# Patient Record
Sex: MALE | Race: WHITE | NOT HISPANIC OR LATINO | Employment: UNEMPLOYED | URBAN - METROPOLITAN AREA
[De-identification: names, ages, dates, MRNs, and addresses within clinical notes are randomized per-mention and may not be internally consistent; named-entity substitution may affect disease eponyms.]

---

## 2017-12-29 ENCOUNTER — RADIANT APPOINTMENT (OUTPATIENT)
Dept: GENERAL RADIOLOGY | Facility: CLINIC | Age: 22
End: 2017-12-29
Attending: ORTHOPAEDIC SURGERY
Payer: COMMERCIAL

## 2017-12-29 ENCOUNTER — OFFICE VISIT (OUTPATIENT)
Dept: ORTHOPEDICS | Facility: CLINIC | Age: 22
End: 2017-12-29
Payer: COMMERCIAL

## 2017-12-29 VITALS — HEIGHT: 70 IN | TEMPERATURE: 98.2 F | BODY MASS INDEX: 20.62 KG/M2 | WEIGHT: 144 LBS

## 2017-12-29 DIAGNOSIS — S69.90XA HAND INJURY: ICD-10-CM

## 2017-12-29 DIAGNOSIS — S62.337A CLOSED DISPLACED FRACTURE OF NECK OF FIFTH METACARPAL BONE OF LEFT HAND, INITIAL ENCOUNTER: Primary | ICD-10-CM

## 2017-12-29 PROCEDURE — 73130 X-RAY EXAM OF HAND: CPT | Mod: TC

## 2017-12-29 PROCEDURE — 26600 TREAT METACARPAL FRACTURE: CPT | Mod: LT | Performed by: ORTHOPAEDIC SURGERY

## 2017-12-29 PROCEDURE — 99203 OFFICE O/P NEW LOW 30 MIN: CPT | Mod: 57 | Performed by: ORTHOPAEDIC SURGERY

## 2017-12-29 ASSESSMENT — PAIN SCALES - GENERAL: PAINLEVEL: WORST PAIN (10)

## 2017-12-29 NOTE — LETTER
12/29/2017         RE: Nomi Miller  [address] [place] [id number]        Dear Colleague,    Thank you for referring your patient, Nomi Miller, to the [facility]. Please see a copy of my visit note below.    ORTHOPEDIC CONSULT      Chief Complaint: Nomi Miller is a 22 year old male who is being seen for Chief Complaint   Patient presents with     Consult     left hand fracture hitting glass at the [facility], DOI: 12/22/17       History of Present Illness:   Inmate brought in by the .  Closed fist injury versus a door.  This happened December 22, 2017.  Denies any other injuries.  He reports some numbness over the dorsum of his hand.        Patient's past medical, surgical, social and family histories reviewed.     Past Medical History:   Diagnosis Date     Anxiety      Depression      Reactive attachment disorder        Past Surgical History:   Procedure Laterality Date     NO HISTORY OF SURGERY         Medications:    Current Outpatient Prescriptions on File Prior to Visit:  amoxicillin-clavulanate (AUGMENTIN) 875-125 MG per tablet Take 1 tablet by mouth every 12 hours. (Patient not taking: Reported on 12/29/2017)   Dexmethylphenidate HCl (FOCALIN PO) Take 20 mg by mouth.   FLUoxetine (PROZAC) 10 MG capsule Take 40 mg by mouth At Bedtime.   QUEtiapine (SEROQUEL) 50 MG tablet Take 1 tablet by mouth every evening. (Patient not taking: Reported on 12/29/2017)   mirtazapine (REMERON) 15 MG tablet Take 3 tablets by mouth At Bedtime. (Patient not taking: Reported on 12/29/2017)     No current facility-administered medications on file prior to visit.     No Known Allergies    Social History     Occupational History     Not on file.     Social History Main Topics     Smoking status: Current Every Day Smoker     Packs/day: 0.50     Smokeless tobacco: Never Used     Alcohol use Yes      Comment: Once very couple months, 8 shots, no DWI, ED visits     Drug use: 2.00 per week      "Special: Marijuana      Comment: vicodin - last took on 1/4/13 (2 pills)     Sexual activity: Yes     Partners: Female      Comment: sexually active with one partner x several years.  Reports being tested in March 2012 (negative)       Family History   Problem Relation Age of Onset     Depression       Substance Abuse       alcohol, meth, heroin, THC     Psychotic Disorder       ADHD       REVIEW OF SYSTEMS  10 point review systems performed otherwise negative as noted as per history of present illness.    Physical Exam:  Vitals: Temp 98.2  F (36.8  C) (Temporal)  Ht 5' 10\" (1.778 m)  Wt 144 lb (65.3 kg)  BMI 20.66 kg/m2  BMI= Body mass index is 20.66 kg/(m^2).  Constitutional: healthy, alert and no acute distress   Psychiatric: mentation appears normal and affect normal/bright  NEURO: no focal deficits  RESP: Normal with easy respirations and no use of accessory muscles to breathe, no audible wheezing or retractions  CV: LUE:   no edema         Regular rate and rhythm by palpation  SKIN: No erythema, rashes, excoriation, or breakdown. No evidence of infection.   JOINT/EXTREMITIES:left hand: Tenderness over the fifth metacarpal.  Ecchymosis and swelling (minimal swelling) over the dorsum of the hand.  No rotational deformity.  No gross deformity.  Flexors and extensors are grossly intact.  Elbow range of motion not fully tested to the small finger.  No wrist pain.     GAIT: not tested     Diagnostic Modalities:  left hand X-ray: Very minimally displaced fifth metacarpal neck fracture  Independent visualization of the images was performed.      Impression: left very minimally displaced fifth metacarpal neck fracture-1 week from injury    Plan:  All of the above pertinent physical exam and imaging modalities findings was reviewed with Nomi and the .  Options reviewed.  Radiographs show very minimal displacement.  1 week from injury.  Recommend casting 3-4 weeks.                                      " CAST/SPLINT APPLICATION:  On today's visit a well padded Fiberglass with waterproof padding  short arm intern cast was applied to the left upper extremity. The neurovascular status is unchanged after application. Cast/splint care was discussed.    Restrictions: No use of affected extremity        Return to clinic 3-4 weeks, or sooner as needed for changes.  Re-x-ray on return: Yes, out of cast first.     Conrad Hoang D.O.    Again, thank you for allowing me to participate in the care of your patient.        Sincerely,        Pj Hoang, DO

## 2017-12-29 NOTE — NURSING NOTE
"Chief Complaint   Patient presents with     Consult     left hand fracture [facility] at the group home, DOI: 12/22/17       Initial Temp 98.2  F (36.8  C) (Temporal)  Ht 1.778 m (5' 10\")  Wt 65.3 kg (144 lb)  BMI 20.66 kg/m2 Estimated body mass index is 20.66 kg/(m^2) as calculated from the following:    Height as of this encounter: 1.778 m (5' 10\").    Weight as of this encounter: 65.3 kg (144 lb).  Medication Reconciliation: complete    "

## 2017-12-29 NOTE — PROGRESS NOTES
ORTHOPEDIC CONSULT      Chief Complaint: Nomi Miller is a 22 year old male who is being seen for Chief Complaint   Patient presents with     Consult     [facility], DOI: 12/22/17       History of Present Illness:   Inmate brought in by the .  Closed fist injury versus a door.  This happened December 22, 2017.  Denies any other injuries.  He reports some numbness over the dorsum of his hand.        Patient's past medical, surgical, social and family histories reviewed.     Past Medical History:   Diagnosis Date     Anxiety      Depression      Reactive attachment disorder        Past Surgical History:   Procedure Laterality Date     NO HISTORY OF SURGERY         Medications:    Current Outpatient Prescriptions on File Prior to Visit:  amoxicillin-clavulanate (AUGMENTIN) 875-125 MG per tablet Take 1 tablet by mouth every 12 hours. (Patient not taking: Reported on 12/29/2017)   Dexmethylphenidate HCl (FOCALIN PO) Take 20 mg by mouth.   FLUoxetine (PROZAC) 10 MG capsule Take 40 mg by mouth At Bedtime.   QUEtiapine (SEROQUEL) 50 MG tablet Take 1 tablet by mouth every evening. (Patient not taking: Reported on 12/29/2017)   mirtazapine (REMERON) 15 MG tablet Take 3 tablets by mouth At Bedtime. (Patient not taking: Reported on 12/29/2017)     No current facility-administered medications on file prior to visit.     No Known Allergies    Social History     Occupational History     Not on file.     Social History Main Topics     Smoking status: Current Every Day Smoker     Packs/day: 0.50     Smokeless tobacco: Never Used     Alcohol use Yes      Comment: Once very couple months, 8 shots, no DWI, ED visits     Drug use: 2.00 per week     Special: Marijuana      Comment: vicodin - last took on 1/4/13 (2 pills)     Sexual activity: Yes     Partners: Female      Comment: sexually active with one partner x several years.  Reports being tested in March 2012 (negative)       Family  "History   Problem Relation Age of Onset     Depression       Substance Abuse       alcohol, meth, heroin, THC     Psychotic Disorder       ADHD       REVIEW OF SYSTEMS  10 point review systems performed otherwise negative as noted as per history of present illness.    Physical Exam:  Vitals: Temp 98.2  F (36.8  C) (Temporal)  Ht 5' 10\" (1.778 m)  Wt 144 lb (65.3 kg)  BMI 20.66 kg/m2  BMI= Body mass index is 20.66 kg/(m^2).  Constitutional: healthy, alert and no acute distress   Psychiatric: mentation appears normal and affect normal/bright  NEURO: no focal deficits  RESP: Normal with easy respirations and no use of accessory muscles to breathe, no audible wheezing or retractions  CV: LUE:   no edema         Regular rate and rhythm by palpation  SKIN: No erythema, rashes, excoriation, or breakdown. No evidence of infection.   JOINT/EXTREMITIES:left hand: Tenderness over the fifth metacarpal.  Ecchymosis and swelling (minimal swelling) over the dorsum of the hand.  No rotational deformity.  No gross deformity.  Flexors and extensors are grossly intact.  Elbow range of motion not fully tested to the small finger.  No wrist pain.     GAIT: not tested     Diagnostic Modalities:  left hand X-ray: Very minimally displaced fifth metacarpal neck fracture  Independent visualization of the images was performed.      Impression: left very minimally displaced fifth metacarpal neck fracture-1 week from injury    Plan:  All of the above pertinent physical exam and imaging modalities findings was reviewed with Nomi and the .  Options reviewed.  Radiographs show very minimal displacement.  1 week from injury.  Recommend casting 3-4 weeks.                                      CAST/SPLINT APPLICATION:  On today's visit a well padded Fiberglass with waterproof padding  short arm intern cast was applied to the left upper extremity. The neurovascular status is unchanged after application. Cast/splint care was " discussed.    Restrictions: No use of affected extremity        Return to clinic 3-4 weeks, or sooner as needed for changes.  Re-x-ray on return: Yes, out of cast first.     Conrad Hoang D.O.

## 2017-12-29 NOTE — MR AVS SNAPSHOT
"              After Visit Summary   2017    Nomi Miller    MRN: [id number]           Patient Information     Date Of Birth          1995        Visit Information        Provider Department      2017 3:30 PM Pj Hoang,  [facility]        Today's Diagnoses     Closed displaced fracture of neck of fifth metacarpal bone of left hand, initial encounter    -  1       Follow-ups after your visit        Who to contact     If you have questions or need follow up information about today's clinic visit or your schedule please contact [facility] directly at [phone].  Normal or non-critical lab and imaging results will be communicated to you by Saviokehart, letter or phone within 4 business days after the clinic has received the results. If you do not hear from us within 7 days, please contact the clinic through Saviokehart or phone. If you have a critical or abnormal lab result, we will notify you by phone as soon as possible.  Submit refill requests through Bevvy or call your pharmacy and they will forward the refill request to us. Please allow 3 business days for your refill to be completed.          Additional Information About Your Visit        MyChart Information     Bevvy lets you send messages to your doctor, view your test results, renew your prescriptions, schedule appointments and more. To sign up, go to [email or web address] . Click on \"Log in\" on the left side of the screen, which will take you to the Welcome page. Then click on \"Sign up Now\" on the right side of the page.     You will be asked to enter the access code listed below, as well as some personal information. Please follow the directions to create your username and password.     Your access code is: [email or web address]  Expires: 3/29/2018  4:15 PM     Your access code will  in 90 days. If you need help or a new code, please call your [facility] or [phone].        Care " "EveryWhere ID     This is your Care EveryWhere ID. This could be used by other organizations to access your [facility] medical records  [id number]        Your Vitals Were     Temperature Height BMI (Body Mass Index)             98.2  F (36.8  C) (Temporal) 5' 10\" (1.778 m) 20.66 kg/m2          Blood Pressure from Last 3 Encounters:   01/12/13 121/71   07/23/12 137/71    Weight from Last 3 Encounters:   12/29/17 144 lb (65.3 kg)   01/09/13 152 lb 12.8 oz (69.3 kg) (60 %)*   07/18/12 143 lb (64.9 kg) (49 %)*     * Growth percentiles are based on CDC 2-20 Years data.              We Performed the Following     APPLY SHORT ARM CAST        Primary Care Provider Office Phone # Fax #    Marcellus Henry [phone]       [facility] [address] [place] [id number]        Equal Access to Services     [facility] : Hadii aad ku hadasho Soomaali, waaxda luqadaha, qaybta kaalmada adeegyada, waxay idiin haylesleyn hans blanco . So [facility] [phone].    ATENCIÓN: Si habla español, tiene a mcmanus disposición servicios gratuitos de asistencia lingüística. Llame al [phone].    We comply with applicable federal civil rights laws and [place] laws. We do not discriminate on the basis of race, color, national origin, age, disability, sex, sexual orientation, or gender identity.            Thank you!     Thank you for choosing [facility]  for your care. Our goal is always to provide you with excellent care. Hearing back from our patients is one way we can continue to improve our services. Please take a few minutes to complete the written survey that you may receive in the mail after your visit with us. Thank you!             Your Updated Medication List - Protect others around you: Learn how to safely use, store and throw away your medicines at www.disposemymeds.org.          This list is accurate as of: 12/29/17  4:15 PM.  Always use your most recent med list.                   " Brand Name Dispense Instructions for use Diagnosis    amoxicillin-clavulanate 875-125 MG per tablet    AUGMENTIN    20 tablet    Take 1 tablet by mouth every 12 hours.    Self-injurious behavior       FLUoxetine 10 MG capsule    PROzac     Take 40 mg by mouth At Bedtime.        FOCALIN PO      Take 20 mg by mouth.        IBUPROFEN PO      Take 600 mg by mouth        mirtazapine 15 MG tablet    REMERON    30 tablet    Take 3 tablets by mouth At Bedtime.    Depression, major, recurrent (H)       QUEtiapine 50 MG tablet    SEROquel    30 tablet    Take 1 tablet by mouth every evening.    Depression, major, recurrent (H)

## 2018-01-11 ENCOUNTER — RADIANT APPOINTMENT (OUTPATIENT)
Dept: GENERAL RADIOLOGY | Facility: CLINIC | Age: 23
End: 2018-01-11
Attending: ORTHOPAEDIC SURGERY
Payer: COMMERCIAL

## 2018-01-11 ENCOUNTER — OFFICE VISIT (OUTPATIENT)
Dept: ORTHOPEDICS | Facility: CLINIC | Age: 23
End: 2018-01-11
Payer: COMMERCIAL

## 2018-01-11 VITALS — BODY MASS INDEX: 20.72 KG/M2 | WEIGHT: 148 LBS | HEIGHT: 71 IN | TEMPERATURE: 96 F

## 2018-01-11 DIAGNOSIS — S62.337A CLOSED DISPLACED FRACTURE OF NECK OF FIFTH METACARPAL BONE OF LEFT HAND, INITIAL ENCOUNTER: ICD-10-CM

## 2018-01-11 DIAGNOSIS — S62.337A CLOSED DISPLACED FRACTURE OF NECK OF FIFTH METACARPAL BONE OF LEFT HAND, INITIAL ENCOUNTER: Primary | ICD-10-CM

## 2018-01-11 PROCEDURE — 99213 OFFICE O/P EST LOW 20 MIN: CPT | Performed by: ORTHOPAEDIC SURGERY

## 2018-01-11 PROCEDURE — 73130 X-RAY EXAM OF HAND: CPT | Mod: TC

## 2018-01-11 ASSESSMENT — PAIN SCALES - GENERAL: PAINLEVEL: MODERATE PAIN (4)

## 2018-01-11 NOTE — NURSING NOTE
"Chief Complaint   Patient presents with     RECHECK     left very minimally displaced fifth metacarpal neck fracture- DOI: 12/22/2017       Initial Temp 96  F (35.6  C)  Ht 1.803 m (5' 11\")  Wt 67.1 kg (148 lb)  BMI 20.64 kg/m2 Estimated body mass index is 20.64 kg/(m^2) as calculated from the following:    Height as of this encounter: 1.803 m (5' 11\").    Weight as of this encounter: 67.1 kg (148 lb).  Medication Reconciliation: complete    BP completed using cuff size: NA (Not Taken)    Laxmi Pinto MA      "

## 2018-01-11 NOTE — MR AVS SNAPSHOT
"              After Visit Summary   1/11/2018    Nomi Miller    MRN: [id number]           Patient Information     Date Of Birth          1995        Visit Information        Provider Department      1/11/2018 8:10 AM Pj Hoang DO [facility]        Today's Diagnoses     Closed displaced fracture of neck of fifth metacarpal bone of left hand, initial encounter    -  1       Follow-ups after your visit        Your next 10 appointments already scheduled     Jan 25, 2018  8:10 AM CST   Return Visit with Pj Hoang DO   [facility]    [address] [place] [id number] [phone]              Who to contact     If you have questions or need follow up information about today's clinic visit or your schedule please contact [facility] directly at [phone].  Normal or non-critical lab and imaging results will be communicated to you by MyChart, letter or phone within 4 business days after the clinic has received the results. If you do not hear from us within 7 days, please contact the clinic through MyChart or phone. If you have a critical or abnormal lab result, we will notify you by phone as soon as possible.  Submit refill requests through Sincerely or call your pharmacy and they will forward the refill request to us. Please allow 3 business days for your refill to be completed.          Additional Information About Your Visit        MyChart Information     Sincerely lets you send messages to your doctor, view your test results, renew your prescriptions, schedule appointments and more. To sign up, go to [email or web address] . Click on \"Log in\" on the left side of the screen, which will take you to the Welcome page. Then click on \"Sign up Now\" on the right side of the page.     You will be asked to enter the access code listed below, as well as some personal information. Please follow the " "directions to create your username and password.     Your access code is: [email or web address]  Expires: 3/29/2018  4:15 PM     Your access code will  in 90 days. If you need help or a new code, please call your [place] clinic or [phone].        Care EveryWhere ID     This is your Care EveryWhere ID. This could be used by other organizations to access your [facility] medical records  [id number]        Your Vitals Were     Temperature Height BMI (Body Mass Index)             96  F (35.6  C) 1.803 m (5' 11\") 20.64 kg/m2          Blood Pressure from Last 3 Encounters:   13 121/71   12 137/71    Weight from Last 3 Encounters:   18 67.1 kg (148 lb)   17 65.3 kg (144 lb)   13 69.3 kg (152 lb 12.8 oz) (60 %)*     * Growth percentiles are based on [facility] 2-20 Years data.               Primary Care Provider Office Phone # Fax #    Marcellus Henry [phone]       [place] [address]        Equal Access to Services     [place] ALSTON AH: Hadii mary rollins hadasho Soomaali, waaxda luqadaha, qaybta kaalmada adeegyada, kaylah angeles. So [facility] [phone].    ATENCIÓN: Si habla español, tiene a mcmanus disposición servicios gratuitos de asistencia lingüística. Llame al [phone].    We comply with applicable federal civil rights laws and [place] laws. We do not discriminate on the basis of race, color, national origin, age, disability, sex, sexual orientation, or gender identity.            Thank you!     Thank you for choosing [facility]  for your care. Our goal is always to provide you with excellent care. Hearing back from our patients is one way we can continue to improve our services. Please take a few minutes to complete the written survey that you may receive in the mail after your visit with us. Thank you!             Your Updated Medication List - Protect others around you: Learn how to safely use, " store and throw away your medicines at www.disposemymeds.org.          This list is accurate as of: 1/11/18 12:34 PM.  Always use your most recent med list.                   Brand Name Dispense Instructions for use Diagnosis    amoxicillin-clavulanate 875-125 MG per tablet    AUGMENTIN    20 tablet    Take 1 tablet by mouth every 12 hours.    Self-injurious behavior       FLUoxetine 10 MG capsule    PROzac     Take 40 mg by mouth At Bedtime.        FOCALIN PO      Take 20 mg by mouth.        IBUPROFEN PO      Take 600 mg by mouth        mirtazapine 15 MG tablet    REMERON    30 tablet    Take 3 tablets by mouth At Bedtime.    Depression, major, recurrent (H)       QUEtiapine 50 MG tablet    SEROquel    30 tablet    Take 1 tablet by mouth every evening.    Depression, major, recurrent (H)

## 2018-01-11 NOTE — LETTER
"    1/11/2018         [facility]  [address] [facility] [address]        Dear Colleague,    Thank you for referring your patient, Nomi Miller, to the [facility]. Please see a copy of my visit note below.    Office Visit-Follow up    Chief Complaint: Nomi Miller is a 22 year old male who is being seen for   Chief Complaint   Patient presents with     RECHECK     left very minimally displaced fifth metacarpal neck fracture- DOI: 12/22/2017       History of Present Illness:   Today's visit:  Returns sooner than expected as cast became loose after swelling decreased in hand.  Returns for new cast. Patient states the cast started to loosen up, but left it on.  States pain better.  Denies numbness.  States can fully move his finger.  No new injury, complaint, or concern.     12/29/17 visit:  Inmate brought in by the .  Closed fist injury versus a door.  This happened December 22, 2017.  Denies any other injuries.  He reports some numbness over the dorsum of his hand.    REVIEW OF SYSTEMS  General: negative for, night sweats, dizziness, fatigue  Resp: No shortness of breath and no cough  CV: negative for chest pain, syncope or near-syncope  GI: negative for nausea, vomiting and diarrhea  : negative for dysuria and hematuria  Musculoskeletal: as above  Neurologic: negative for syncope   Hematologic: negative for bleeding disorder    Physical Exam:  Vitals: Temp 96  F (35.6  C)  Ht 1.803 m (5' 11\")  Wt 67.1 kg (148 lb)  BMI 20.64 kg/m2  BMI= Body mass index is 20.64 kg/(m^2).  Constitutional: healthy, alert and no acute distress   Psychiatric: mentation appears normal and affect normal/bright  NEURO: no focal deficits  RESP: Normal with easy respirations and no use of accessory muscles to breathe, no audible wheezing or retractions  CV: LUE:   no edema         Regular rate and rhythm by palpation  SKIN: No erythema, rashes, excoriation, or breakdown. No " evidence of infection.   JOINT/EXTREMITIES Non-Tender over the fifth metacarpal.  mild Ecchymosis and no swelling over the dorsum of the hand.  No rotational deformity.  No gross deformity.  Flexors and extensors are grossly intact.  Elbow range of motion not fully tested to the small finger.  No wrist pain.  Distal neurovascular grossly intact.  No decreased sensation per report.             Diagnostic Modalities:  left hand X-ray and compared against previous. Very minimally displaced fifth metacarpal neck fracture. No change in alignment.  Early evidence of healing  Independent visualization of the images was performed.      Impression: left very minimally displaced fifth metacarpal neck fracture-3 week from injury    Plan:  All of the above pertinent physical exam and imaging modalities findings was reviewed with Cipriano.        On today's visit a well padded Fiberglass  with waterproof padding  short arm cast was applied to the left upper extremity. The neurovascular status is unchanged after application. Cast/splint care was discussed. Applied by Dr. Hoang      No use of affected extremity.  Keep cast clean dry intact.    Recommend 2 more weeks of casting.     Return to clinic 2, week(s), or sooner as needed for changes.  Re-x-ray on return: Yes. Out of Cast.    Scribed by:  Reggie Licona, APRN, CNP, DNP  8:48 AM  1/11/2018    I attest I have seen and evaluated the patient.  I agree with above impression and plan.  Conrad Hoang D.O.          Again, thank you for allowing me to participate in the care of your patient.        Sincerely,        Pj Hoang, DO

## 2018-01-11 NOTE — PROGRESS NOTES
"Office Visit-Follow up    Chief Complaint: Nomi Miller is a 22 year old male who is being seen for   Chief Complaint   Patient presents with     RECHECK     left very minimally displaced fifth metacarpal neck fracture- DOI: 12/22/2017       History of Present Illness:   Today's visit:  Returns sooner than expected as cast became loose after swelling decreased in hand.  Returns for new cast. Patient states the cast started to loosen up, but left it on.  States pain better.  Denies numbness.  States can fully move his finger.  No new injury, complaint, or concern.     12/29/17 visit:  Inmate brought in by the .  Closed fist injury versus a door.  This happened December 22, 2017.  Denies any other injuries.  He reports some numbness over the dorsum of his hand.    REVIEW OF SYSTEMS  General: negative for, night sweats, dizziness, fatigue  Resp: No shortness of breath and no cough  CV: negative for chest pain, syncope or near-syncope  GI: negative for nausea, vomiting and diarrhea  : negative for dysuria and hematuria  Musculoskeletal: as above  Neurologic: negative for syncope   Hematologic: negative for bleeding disorder    Physical Exam:  Vitals: Temp 96  F (35.6  C)  Ht 1.803 m (5' 11\")  Wt 67.1 kg (148 lb)  BMI 20.64 kg/m2  BMI= Body mass index is 20.64 kg/(m^2).  Constitutional: healthy, alert and no acute distress   Psychiatric: mentation appears normal and affect normal/bright  NEURO: no focal deficits  RESP: Normal with easy respirations and no use of accessory muscles to breathe, no audible wheezing or retractions  CV: LUE:   no edema         Regular rate and rhythm by palpation  SKIN: No erythema, rashes, excoriation, or breakdown. No evidence of infection.   JOINT/EXTREMITIES Non-Tender over the fifth metacarpal.  mild Ecchymosis and no swelling over the dorsum of the hand.  No rotational deformity.  No gross deformity.  Flexors and extensors are grossly intact.  Elbow range of motion not " fully tested to the small finger.  No wrist pain.  Distal neurovascular grossly intact.  No decreased sensation per report.             Diagnostic Modalities:  left hand X-ray and compared against previous. Very minimally displaced fifth metacarpal neck fracture. No change in alignment.  Early evidence of healing  Independent visualization of the images was performed.      Impression: left very minimally displaced fifth metacarpal neck fracture-3 week from injury    Plan:  All of the above pertinent physical exam and imaging modalities findings was reviewed with Cipriano.        On today's visit a well padded Fiberglass  with waterproof padding  short arm cast was applied to the left upper extremity. The neurovascular status is unchanged after application. Cast/splint care was discussed. Applied by Dr. Hoang      No use of affected extremity.  Keep cast clean dry intact.    Recommend 2 more weeks of casting.     Return to clinic 2, week(s), or sooner as needed for changes.  Re-x-ray on return: Yes. Out of Cast.    Scribed by:  Reggie Licona, APRN, CNP, DNP  8:48 AM  1/11/2018    I attest I have seen and evaluated the patient.  I agree with above impression and plan.  Conrad Hoang D.O.

## 2018-01-25 ENCOUNTER — RADIANT APPOINTMENT (OUTPATIENT)
Dept: GENERAL RADIOLOGY | Facility: CLINIC | Age: 23
End: 2018-01-25
Attending: ORTHOPAEDIC SURGERY
Payer: COMMERCIAL

## 2018-01-25 ENCOUNTER — OFFICE VISIT (OUTPATIENT)
Dept: ORTHOPEDICS | Facility: CLINIC | Age: 23
End: 2018-01-25
Payer: COMMERCIAL

## 2018-01-25 VITALS — TEMPERATURE: 98.1 F

## 2018-01-25 DIAGNOSIS — S62.337A CLOSED DISPLACED FRACTURE OF NECK OF FIFTH METACARPAL BONE OF LEFT HAND, INITIAL ENCOUNTER: ICD-10-CM

## 2018-01-25 DIAGNOSIS — S62.337D CLOSED DISPLACED FRACTURE OF NECK OF FIFTH METACARPAL BONE OF LEFT HAND WITH ROUTINE HEALING, SUBSEQUENT ENCOUNTER: Primary | ICD-10-CM

## 2018-01-25 PROCEDURE — 73130 X-RAY EXAM OF HAND: CPT | Mod: TC

## 2018-01-25 PROCEDURE — 99213 OFFICE O/P EST LOW 20 MIN: CPT | Performed by: ORTHOPAEDIC SURGERY

## 2018-01-25 ASSESSMENT — PAIN SCALES - GENERAL: PAINLEVEL: NO PAIN (0)

## 2018-01-25 NOTE — MR AVS SNAPSHOT
After Visit Summary   1/25/2018    Nomi Miller    MRN: [id number]           Patient Information     Date Of Birth          1995        Visit Information        Provider Department      1/25/2018 8:10 AM Pj Hoang,  [facility]        Today's Diagnoses     Closed displaced fracture of neck of fifth metacarpal bone of left hand, initial encounter    -  1       Follow-ups after your visit        Your next 10 appointments already scheduled     Jan 25, 2018  8:20 AM CST   (Arrive by 8:05 AM)   XR HAND LEFT G/E 3 VIEWS with [id number]   [facility] ([facility])    [address] [place] [id number]              Please bring a list of your current medicines to your exam. (Include vitamins, minerals and over-thecounter medicines.) Leave your valuables at home.  Tell your doctor if there is a chance you may be pregnant.  You do not need to do anything special for this exam.              Who to contact     If you have questions or need follow up information about today's clinic visit or your schedule please contact [facility] directly at [phone].  Normal or non-critical lab and imaging results will be communicated to you by MyChart, letter or phone within 4 business days after the clinic has received the results. If you do not hear from us within 7 days, please contact the clinic through [employer]hart or phone. If you have a critical or abnormal lab result, we will notify you by phone as soon as possible.  Submit refill requests through [employer] or call your pharmacy and they will forward the refill request to us. Please allow 3 business days for your refill to be completed.          Additional Information About Your Visit        MyChart Information     [employer] lets you send messages to your doctor, view your test results, renew your prescriptions, schedule appointments and more. To sign up, go to  "[email or web address] . Click on \"Log in\" on the left side of the screen, which will take you to the Welcome page. Then click on \"Sign up Now\" on the right side of the page.     You will be asked to enter the access code listed below, as well as some personal information. Please follow the directions to create your username and password.     Your access code is: [email or web address]  Expires: 3/29/2018  4:15 PM     Your access code will  in 90 days. If you need help or a new code, please call your [place] clinic or [phone].        Care EveryWhere ID     This is your Care EveryWhere ID. This could be used by other organizations to access your [place] medical records  [id number]        Your Vitals Were     Temperature                   98.1  F (36.7  C) (Temporal)            Blood Pressure from Last 3 Encounters:   13 121/71   12 137/71    Weight from Last 3 Encounters:   18 67.1 kg (148 lb)   17 65.3 kg (144 lb)   13 69.3 kg (152 lb 12.8 oz) (60 %)*     * Growth percentiles are based on CDC 2-20 Years data.               Primary Care Provider Office Phone # Fax #    Marcellus Henry [phone]       [address] [address]        Equal Access to Services     [facility] Hospital[facility] : Hadii mary ku hadasho Soomaali, waaxda luqadaha, qaybta kaalmada adeegyada, kaylah angeles. So [facility] [phone].    ATENCIÓN: Si habla español, tiene a mcmanus disposición servicios gratuitos de asistencia lingüística. Aida al [phone].    We comply with applicable federal civil rights laws and [place] laws. We do not discriminate on the basis of race, color, national origin, age, disability, sex, sexual orientation, or gender identity.            Thank you!     Thank you for choosing [facility]  for your care. Our goal is always to provide you with excellent care. Hearing back from our patients is one way we can " continue to improve our services. Please take a few minutes to complete the written survey that you may receive in the mail after your visit with us. Thank you!             Your Updated Medication List - Protect others around you: Learn how to safely use, store and throw away your medicines at www.disposemymeds.org.          This list is accurate as of 1/25/18  8:19 AM.  Always use your most recent med list.                   Brand Name Dispense Instructions for use Diagnosis    IBUPROFEN PO      Take 600 mg by mouth

## 2018-01-25 NOTE — NURSING NOTE
"No chief complaint on file.      Initial Temp 98.1  F (36.7  C) (Temporal) Estimated body mass index is 20.64 kg/(m^2) as calculated from the following:    Height as of 1/11/18: 1.803 m (5' 11\").    Weight as of 1/11/18: 67.1 kg (148 lb).  Medication Reconciliation: complete   Madhuri/CASSIA     "

## 2018-01-25 NOTE — PROGRESS NOTES
Office Visit-Follow up    Chief Complaint: Nomi Miller is a 22 year old male who is being seen for   Chief Complaint   Patient presents with     RECHECK     left very minimally displaced fifth metacarpal neck fracture- DOI: 12/22/2017       History of Present Illness:   Today's visit:  Returns today. Has been in cast for total of 4 weeks.  States hand feeling better still occasional ache.  No new trauma.  Compliant with cast cares. No numbness/tingling.  1/11/18 visit  Returns sooner than expected as cast became loose after swelling decreased in hand.  Returns for new cast. Patient states the cast started to loosen up, but left it on.  States pain better.  Denies numbness.  States can fully move his finger.  No new injury, complaint, or concern.    12/29/17 visit:  Inmate brought in by the .  Closed fist injury versus a door.  This happened December 22, 2017.  Denies any other injuries.  He reports some numbness over the dorsum of his hand.    Physical Exam:  Vitals: Temp 98.1  F (36.7  C) (Temporal)  BMI= There is no height or weight on file to calculate BMI.  Constitutional: healthy, alert and no acute distress   Psychiatric: mentation appears normal and affect normal/bright  NEURO: no focal deficits  RESP: Normal with easy respirations and no use of accessory muscles to breathe, no audible wheezing or retractions  CV: LUE:   no edema         Regular rate and rhythm by palpation  SKIN: No erythema, rashes, excoriation, or breakdown. No evidence of infection.   JOINT/EXTREMITIES:left Hand/Finger Exam: no bruising, no swelling.  Mild tenderness at fifth metacarpal near fracture site.  Sensation intact.  Range of motion intact to digits, wrist, and hand.  Cap refill <2, skin pink warm dry.  No areas of skin breakdown.    GAIT: non-antalgic    Diagnostic Modalities:  left hand X-ray and compared against previous. Very minimally displaced fifth metacarpal neck fracture. No change in alignment.  Further  evidence of healing  Independent visualization of the images was performed.      Impression: left  minimally displaced fifth metacarpal neck fracture-4 week from injury    Plan:  All of the above pertinent physical exam and imaging modalities findings was reviewed with Nomi and the C.O.    Evidence of healing on imaging and clinically improved.  ROM intact to hand and fingers.     Recommended stay out of the cast, no splint necessary, work on gentle range of motion.  Explained healing still progressing and the finger would be more vulnerable to fracture yet.  Recommended against any trauma or strenuous activity.  Per patient he does not use the gym and is on activity restriction.        Return to clinic PRN, or sooner as needed for changes.  Re-x-ray on return: No    Scribed by:  Reggie Licona, APRN, CNP, DNP  8:36 AM  1/25/2018    I attest I have seen and evaluated the patient.  I agree with above impression and plan.  Conrad Hoang D.O.

## 2018-01-25 NOTE — LETTER
1/25/2018         [facility]  [address] [facility] [address]        Dear Colleague,    Thank you for referring your patient, Nomi Miller, to the [facility]. Please see a copy of my visit note below.    Office Visit-Follow up    Chief Complaint: Nomi Miller is a 22 year old male who is being seen for   Chief Complaint   Patient presents with     RECHECK     left very minimally displaced fifth metacarpal neck fracture- DOI: 12/22/2017       History of Present Illness:   Today's visit:  Returns today. Has been in cast for total of 4 weeks.  States hand feeling better still occasional ache.  No new trauma.  Compliant with cast cares. No numbness/tingling.  1/11/18 visit  Returns sooner than expected as cast became loose after swelling decreased in hand.  Returns for new cast. Patient states the cast started to loosen up, but left it on.  States pain better.  Denies numbness.  States can fully move his finger.  No new injury, complaint, or concern.    12/29/17 visit:  Inmate brought in by the .  Closed fist injury versus a door.  This happened December 22, 2017.  Denies any other injuries.  He reports some numbness over the dorsum of his hand.    Physical Exam:  Vitals: Temp 98.1  F (36.7  C) (Temporal)  BMI= There is no height or weight on file to calculate BMI.  Constitutional: healthy, alert and no acute distress   Psychiatric: mentation appears normal and affect normal/bright  NEURO: no focal deficits  RESP: Normal with easy respirations and no use of accessory muscles to breathe, no audible wheezing or retractions  CV: LUE:   no edema         Regular rate and rhythm by palpation  SKIN: No erythema, rashes, excoriation, or breakdown. No evidence of infection.   JOINT/EXTREMITIES:left Hand/Finger Exam: no bruising, no swelling.  Mild tenderness at fifth metacarpal near fracture site.  Sensation intact.  Range of motion intact to digits, wrist, and  hand.  Cap refill <2, skin pink warm dry.  No areas of skin breakdown.    GAIT: non-antalgic    Diagnostic Modalities:  left hand X-ray and compared against previous. Very minimally displaced fifth metacarpal neck fracture. No change in alignment.  Further evidence of healing  Independent visualization of the images was performed.      Impression: left  minimally displaced fifth metacarpal neck fracture-4 week from injury    Plan:  All of the above pertinent physical exam and imaging modalities findings was reviewed with Nomi and the C.O.    Evidence of healing on imaging and clinically improved.  ROM intact to hand and fingers.     Recommended stay out of the cast, no splint necessary, work on gentle range of motion.  Explained healing still progressing and the finger would be more vulnerable to fracture yet.  Recommended against any trauma or strenuous activity.  Per patient he does not use the gym and is on activity restriction.        Return to clinic PRN, or sooner as needed for changes.  Re-x-ray on return: No    Scribed by:  Reggie Licona, APRN, CNP, DNP  8:36 AM  1/25/2018    I attest I have seen and evaluated the patient.  I agree with above impression and plan.  Conrad Hoang D.O.            Again, thank you for allowing me to participate in the care of your patient.        Sincerely,        Pj Hoang, DO

## 2018-09-13 ENCOUNTER — HOSPITAL ENCOUNTER (EMERGENCY)
Facility: CLINIC | Age: 23
Discharge: HOME | End: 2018-09-14
Attending: EMERGENCY MEDICINE | Admitting: EMERGENCY MEDICINE
Payer: COMMERCIAL

## 2018-09-13 DIAGNOSIS — R41.0 DELIRIUM: ICD-10-CM

## 2018-09-13 DIAGNOSIS — F12.90 MARIJUANA USE: ICD-10-CM

## 2018-09-13 DIAGNOSIS — F41.9 ANXIETY: ICD-10-CM

## 2018-09-13 LAB
ANION GAP SERPL CALCULATED.3IONS-SCNC: 10 MMOL/L (ref 3–14)
BASOPHILS # BLD AUTO: 0.1 10E9/L (ref 0–0.2)
BASOPHILS NFR BLD AUTO: 1.2 %
BUN SERPL-MCNC: 13 MG/DL (ref 7–30)
CALCIUM SERPL-MCNC: 8.5 MG/DL (ref 8.5–10.1)
CHLORIDE SERPL-SCNC: 102 MMOL/L (ref 94–109)
CO2 SERPL-SCNC: 24 MMOL/L (ref 20–32)
CREAT SERPL-MCNC: 0.88 MG/DL (ref 0.66–1.25)
DIFFERENTIAL METHOD BLD: NORMAL
EOSINOPHIL # BLD AUTO: 0.1 10E9/L (ref 0–0.7)
EOSINOPHIL NFR BLD AUTO: 1.4 %
ERYTHROCYTE [DISTWIDTH] IN BLOOD BY AUTOMATED COUNT: 12.7 % (ref 10–15)
ETHANOL SERPL-MCNC: <0.01 G/DL
GFR SERPL CREATININE-BSD FRML MDRD: >90 ML/MIN/1.7M2
GLUCOSE SERPL-MCNC: 118 MG/DL (ref 70–99)
HCT VFR BLD AUTO: 43.6 % (ref 40–53)
HGB BLD-MCNC: 15.4 G/DL (ref 13.3–17.7)
IMM GRANULOCYTES # BLD: 0 10E9/L (ref 0–0.4)
IMM GRANULOCYTES NFR BLD: 0.3 %
LYMPHOCYTES # BLD AUTO: 4.2 10E9/L (ref 0.8–5.3)
LYMPHOCYTES NFR BLD AUTO: 45.2 %
MCH RBC QN AUTO: 30.8 PG (ref 26.5–33)
MCHC RBC AUTO-ENTMCNC: 35.3 G/DL (ref 31.5–36.5)
MCV RBC AUTO: 87 FL (ref 78–100)
MONOCYTES # BLD AUTO: 0.7 10E9/L (ref 0–1.3)
MONOCYTES NFR BLD AUTO: 7.8 %
NEUTROPHILS # BLD AUTO: 4.1 10E9/L (ref 1.6–8.3)
NEUTROPHILS NFR BLD AUTO: 44.1 %
NRBC # BLD AUTO: 0 10*3/UL
NRBC BLD AUTO-RTO: 0 /100
PLATELET # BLD AUTO: 249 10E9/L (ref 150–450)
POTASSIUM SERPL-SCNC: 3.3 MMOL/L (ref 3.4–5.3)
RBC # BLD AUTO: 5 10E12/L (ref 4.4–5.9)
SODIUM SERPL-SCNC: 136 MMOL/L (ref 133–144)
WBC # BLD AUTO: 9.3 10E9/L (ref 4–11)

## 2018-09-13 PROCEDURE — 85025 COMPLETE CBC W/AUTO DIFF WBC: CPT | Performed by: PHYSICIAN ASSISTANT

## 2018-09-13 PROCEDURE — 96374 THER/PROPH/DIAG INJ IV PUSH: CPT

## 2018-09-13 PROCEDURE — 96361 HYDRATE IV INFUSION ADD-ON: CPT

## 2018-09-13 PROCEDURE — 99285 EMERGENCY DEPT VISIT HI MDM: CPT | Mod: 25

## 2018-09-13 PROCEDURE — 25000128 H RX IP 250 OP 636: Performed by: PHYSICIAN ASSISTANT

## 2018-09-13 PROCEDURE — 93005 ELECTROCARDIOGRAM TRACING: CPT

## 2018-09-13 PROCEDURE — 96372 THER/PROPH/DIAG INJ SC/IM: CPT | Mod: XS

## 2018-09-13 PROCEDURE — 80320 DRUG SCREEN QUANTALCOHOLS: CPT | Performed by: PHYSICIAN ASSISTANT

## 2018-09-13 PROCEDURE — 80048 BASIC METABOLIC PNL TOTAL CA: CPT | Performed by: PHYSICIAN ASSISTANT

## 2018-09-13 RX ORDER — LORAZEPAM 2 MG/ML
0.5 INJECTION INTRAMUSCULAR ONCE
Status: COMPLETED | OUTPATIENT
Start: 2018-09-13 | End: 2018-09-13

## 2018-09-13 RX ORDER — LORAZEPAM 2 MG/ML
0.5 INJECTION INTRAMUSCULAR ONCE
Status: COMPLETED | OUTPATIENT
Start: 2018-09-13 | End: 2018-09-14

## 2018-09-13 RX ADMIN — SODIUM CHLORIDE 1000 ML: 9 INJECTION, SOLUTION INTRAVENOUS at 20:26

## 2018-09-13 RX ADMIN — LORAZEPAM 0.5 MG: 2 INJECTION INTRAMUSCULAR; INTRAVENOUS at 20:25

## 2018-09-13 ASSESSMENT — ENCOUNTER SYMPTOMS
HEADACHES: 0
ABDOMINAL PAIN: 0
NUMBNESS: 1
CHEST TIGHTNESS: 1
FEVER: 0

## 2018-09-13 NOTE — ED AVS SNAPSHOT
[facility] Emergency Department    [address]    [facility] [address]    Phone:  [phone]    Fax:  [phone]                                       Nomi Miller   MRN: [id number]    Department:  [facility] Emergency Department   Date of Visit:  9/13/2018           After Visit Summary Signature Page     I have received my discharge instructions, and my questions have been answered. I have discussed any challenges I see with this plan with the nurse or doctor.    ..........................................................................................................................................  Patient/Patient Representative Signature      ..........................................................................................................................................  Patient Representative Print Name and Relationship to Patient    ..................................................               ................................................  Date                                   Time    ..........................................................................................................................................  Reviewed by Signature/Title    ...................................................              ..............................................  Date                                               Time          22EPIC Rev 08/18

## 2018-09-13 NOTE — ED AVS SNAPSHOT
[facility] Emergency Department    [address] [place] [address]    Phone:  [phone]    Fax:  [phone]                                       Nomi Miller   MRN: [id number]    Department:  [facility] Emergency Department   Date of Visit:  9/13/2018           Patient Information     Date Of Birth          1995        Your diagnoses for this visit were:     Marijuana use     Anxiety     Delirium        You were seen by Montez Medel MD and Marcellus Baker MD.      Follow-up Information     Follow up with PCP as needed.      Discharge References/Attachments     DRUG ABUSE (ENGLISH)      24 Hour Appointment Hotline       To make an appointment at any [facility] clinic, call [phone]). If you don't have a family doctor or clinic, we will help you find one. [facility] clinics are conveniently located to serve the needs of you and your family.             Review of your medicines      Notice     You have not been prescribed any medications.            Procedures and tests performed during your visit     Alcohol ethyl    Basic metabolic panel    CBC with platelets differential    EKG 12 lead      Orders Needing Specimen Collection     None      Pending Results     Date and Time Order Name Status Description    9/13/2018 2003 EKG 12 lead Preliminary             Pending Culture Results     No orders found for last 3 day(s).            Pending Results Instructions     If you had any lab results that were not finalized at the time of your Discharge, you can call the ED Lab Result RN at [phone]. You will be contacted by this team for any positive Lab results or changes in treatment. The nurses are available 7 days a week from 10A to 6:30P.  You can leave a message 24 hours per day and they will return your call.        Test Results From Your Hospital Stay        9/13/2018  9:16 PM      Component Results     Component Value Ref Range  & Units Status    Sodium 136 133 - 144 mmol/L Final    Potassium 3.3 (L) 3.4 - 5.3 mmol/L Final    Chloride 102 94 - 109 mmol/L Final    Carbon Dioxide 24 20 - 32 mmol/L Final    Anion Gap 10 3 - 14 mmol/L Final    Glucose 118 (H) 70 - 99 mg/dL Final    Urea Nitrogen 13 7 - 30 mg/dL Final    Creatinine 0.88 0.66 - 1.25 mg/dL Final    GFR Estimate >90 >60 mL/min/1.7m2 Final    Non  GFR Calc    GFR Estimate If Black >90 >60 mL/min/1.7m2 Final    African American GFR Calc    Calcium 8.5 8.5 - 10.1 mg/dL Final         9/13/2018  9:16 PM      Component Results     Component Value Ref Range & Units Status    WBC 9.3 4.0 - 11.0 10e9/L Final    RBC Count 5.00 4.4 - 5.9 10e12/L Final    Hemoglobin 15.4 13.3 - 17.7 g/dL Final    Hematocrit 43.6 40.0 - 53.0 % Final    MCV 87 78 - 100 fl Final    MCH 30.8 26.5 - 33.0 pg Final    MCHC 35.3 31.5 - 36.5 g/dL Final    RDW 12.7 10.0 - 15.0 % Final    Platelet Count 249 150 - 450 10e9/L Final    Diff Method Automated Method  Final    % Neutrophils 44.1 % Final    % Lymphocytes 45.2 % Final    % Monocytes 7.8 % Final    % Eosinophils 1.4 % Final    % Basophils 1.2 % Final    % Immature Granulocytes 0.3 % Final    Nucleated RBCs 0 0 /100 Final    Absolute Neutrophil 4.1 1.6 - 8.3 10e9/L Final    Absolute Lymphocytes 4.2 0.8 - 5.3 10e9/L Final    Absolute Monocytes 0.7 0.0 - 1.3 10e9/L Final    Absolute Eosinophils 0.1 0.0 - 0.7 10e9/L Final    Absolute Basophils 0.1 0.0 - 0.2 10e9/L Final    Abs Immature Granulocytes 0.0 0 - 0.4 10e9/L Final    Absolute Nucleated RBC 0.0  Final         9/13/2018  9:16 PM      Component Results     Component Value Ref Range & Units Status    Ethanol g/dL <0.01 <0.01 g/dL Final                Clinical Quality Measure: Blood Pressure Screening     Your blood pressure was checked while you were in the emergency department today. The last reading we obtained was  BP: 124/67 . Please read the guidelines below about what these numbers mean and  "what you should do about them.  If your systolic blood pressure (the top number) is less than 120 and your diastolic blood pressure (the bottom number) is less than 80, then your blood pressure is normal. There is nothing more that you need to do about it.  If your systolic blood pressure (the top number) is 120-139 or your diastolic blood pressure (the bottom number) is 80-89, your blood pressure may be higher than it should be. You should have your blood pressure rechecked within a year by a primary care provider.  If your systolic blood pressure (the top number) is 140 or greater or your diastolic blood pressure (the bottom number) is 90 or greater, you may have high blood pressure. High blood pressure is treatable, but if left untreated over time it can put you at risk for heart attack, stroke, or kidney failure. You should have your blood pressure rechecked by a primary care provider within the next 4 weeks.  If your provider in the emergency department today gave you specific instructions to follow-up with your doctor or provider even sooner than that, you should follow that instruction and not wait for up to 4 weeks for your follow-up visit.        Thank you for choosing [facility]       Thank you for choosing [facility] for your care. Our goal is always to provide you with excellent care. Hearing back from our patients is one way we can continue to improve our services. Please take a few minutes to complete the written survey that you may receive in the mail after you visit with us. Thank you!        [employer] Information     [employer] lets you send messages to your doctor, view your test results, renew your prescriptions, schedule appointments and more. To sign up, go to [email or web address] . Click on \"Log in\" on the left side of the screen, which will take you to the Welcome page. Then click on \"Sign up Now\" on the right side of the page.     You will be asked to enter the access code listed below, as well as " some personal information. Please follow the directions to create your username and password.     Your access code is: [email or web address]  Expires: 2018  5:47 AM     Your access code will  in 90 days. If you need help or a new code, please call your [place] clinic or [phone].        Care EveryWhere ID     This is your Care EveryWhere ID. This could be used by other organizations to access your [place] medical records  [id number]        Equal Access to Services     [facility] CenterDIANE : Daria aragon Sorik, waaxda luqadaha, qaybta kaalmada marciano, kaylah blanco . So [facility] [phone].    ATENCIÓN: Si habla español, tiene a mcmanus disposición servicios gratuitos de asistencia lingüística. Llame al [phone].    We comply with applicable federal civil rights laws and [place] laws. We do not discriminate on the basis of race, color, national origin, age, disability, sex, sexual orientation, or gender identity.            After Visit Summary       This is your record. Keep this with you and show to your community pharmacist(s) and doctor(s) at your next visit.

## 2018-09-14 VITALS
WEIGHT: 145 LBS | HEIGHT: 71 IN | SYSTOLIC BLOOD PRESSURE: 124 MMHG | RESPIRATION RATE: 14 BRPM | TEMPERATURE: 97.9 F | HEART RATE: 92 BPM | DIASTOLIC BLOOD PRESSURE: 67 MMHG | OXYGEN SATURATION: 93 % | BODY MASS INDEX: 20.3 KG/M2

## 2018-09-14 LAB — INTERPRETATION ECG - MUSE: NORMAL

## 2018-09-14 PROCEDURE — 25000128 H RX IP 250 OP 636: Performed by: PHYSICIAN ASSISTANT

## 2018-09-14 PROCEDURE — 96376 TX/PRO/DX INJ SAME DRUG ADON: CPT

## 2018-09-14 PROCEDURE — 25000125 ZZHC RX 250

## 2018-09-14 RX ORDER — KETAMINE HYDROCHLORIDE 100 MG/ML
INJECTION, SOLUTION INTRAMUSCULAR; INTRAVENOUS
Status: COMPLETED
Start: 2018-09-14 | End: 2018-09-14

## 2018-09-14 RX ORDER — LORAZEPAM 2 MG/ML
0.5 INJECTION INTRAMUSCULAR ONCE
Status: DISCONTINUED | OUTPATIENT
Start: 2018-09-14 | End: 2018-09-14 | Stop reason: HOSPADM

## 2018-09-14 RX ORDER — KETAMINE HYDROCHLORIDE 100 MG/ML
300 INJECTION INTRAMUSCULAR; INTRAVENOUS ONCE
Status: COMPLETED | OUTPATIENT
Start: 2018-09-14 | End: 2018-09-14

## 2018-09-14 RX ADMIN — KETAMINE HYDROCHLORIDE 300 MG: 100 INJECTION INTRAMUSCULAR; INTRAVENOUS at 00:11

## 2018-09-14 RX ADMIN — LORAZEPAM 0.5 MG: 2 INJECTION INTRAMUSCULAR; INTRAVENOUS at 00:18

## 2018-09-14 NOTE — ED NOTES
At approx 0004 tech in to attempt catheter urine collection. Pt became belligerent about collection method and became combative. Writer not present during this interaction, but upon arriving in the room writer attempted to deescalate the patient, but patient was aggressive and verbal abusive to staff. Restraints placed and medications administered per MAR.

## 2018-09-14 NOTE — ED NOTES
Bed: ED11  Expected date: 9/13/18  Expected time: 7:51 PM  Means of arrival: Ambulance  Comments:  A Beto

## 2018-09-14 NOTE — ED NOTES
Patient has been out of restraints for the past hour.  Plan to reassess when he wakes up. Anticipate discharge to home later this am.       Marcellus Baker MD  09/14/18 [id number]

## 2018-09-14 NOTE — ED PROVIDER NOTES
"  History     Chief Complaint:  Altered Mental Status      HPI   Nomi Guerrero is a 23 year old male who presents to the ED by EMS with altered mental status. The patient reports that he smoked a \"full bowl\" of what he thought was marijuana with one other person about one hour prior to arrival at the ED. He has tightness and numbness in his chest. He reports that he felt fine before smoking.  He does report a prior history of panic attacks.  He denies any other drug or alcohol use other than daily marijuana use. He reports no headache. He has a history of a heart murmur and asthma. He has no thoughts of hurting himself or others.    Allergies:  No Known Allergies     Medications:      No current outpatient prescriptions on file.    Problem List:    There are no active problems to display for this patient.       Past Medical History:    History reviewed. No pertinent past medical history.    Past Surgical History:   History reviewed. No pertinent surgical history.    Family History:   No family history on file.    Social History:  Marital Status:    Social History   Substance Use Topics     Smoking status: Current Every Day Smoker     Smokeless tobacco: Never Used     Alcohol use Yes        Review of Systems   Constitutional: Negative for fever.   Eyes: Negative for visual disturbance.   Respiratory: Positive for chest tightness.    Gastrointestinal: Negative for abdominal pain.   Neurological: Positive for numbness. Negative for syncope and headaches.   All other systems reviewed and are negative.    Physical Exam   First Vitals:  BP: 127/62  Pulse: 92  Temp: 97.9  F (36.6  C)  Resp: 14  Height: 180.3 cm (5' 11\")  Weight: 65.8 kg (145 lb)  SpO2: 99 %      Physical Exam  General: Patient lethargic but easily aroused and agitated and anxious appearing.  Head:  Scalp is NC/AT  Eyes:  No scleral icterus, pupils 4 mm bilaterally.  No nystagmus.  ENT:  The external nose and ears are normal. The oropharynx is normal and " without erythema; mucus membranes are moist.  Tongue atraumatic.  Neck:  Normal range of motion without rigidity.  CV:  Regular rate and rhythm    No pathologic murmur, rubs, or gallops.  Resp:  Breath sounds are clear bilaterally.  No crackles, wheezes, rhonchi.    Tachypneic.  Non-labored, no retractions or accessory muscle use  GI:  Abdomen is soft, no distension, no tenderness. No peritoneal signs.  Skin:  Warm and moist.  No rash or lesions noted.  Neuro: Oriented x 3. No gross motor deficits.    Strength and sensation grossly intact in all 4 extremities.      Cranial nerves 2-12 intact.    GCS: 15  Psych: Sleepy easily arousable.  Anxious appearing.      Emergency Department Course     ECG (20:03:10):  Rate 89 bpm. FL interval 142. QRS duration 98. QT/QTc 392/476. P-R-T axes 77 87 79. Sinus rhythm with premature atrial complexes. Nonspecific ST abnormality. Mild QT prolongation. Abnormal ECG. Interpreted at 2200 by Montez Medel MD.     Laboratory:  CBC: WNL (WBC 9.3, HGB 15.4, )   BMP: Glucose 118 (H), K 3.3 (L), o/w WNL (Creatinine 0.88)   Alcohol ethyl: <0.01    Interventions:  Medications   LORazepam (ATIVAN) injection 0.5 mg (not administered)   0.9% sodium chloride BOLUS (0 mLs Intravenous Stopped 9/13/18 2211)   LORazepam (ATIVAN) injection 0.5 mg (0.5 mg Intravenous Given 9/13/18 2025)   2025 - Ativan 0.5 mg IV  2026 - NS 1L IV Bolus     Emergency Department Course:  The patient arrived in the emergency department via EMS.   Past medical records, nursing notes, and vitals reviewed.  2008: I performed an exam of the patient and obtained history, as documented above.    IV inserted and blood drawn.     The patient's girlfriend arrived to the ED. Patient is still significantly lethargic.     Pending improvement in mental status. [facility] further evaluation and disposition planning.     Impression & Plan      Medical Decision Making:  Nomi  Cesar is a 23-year-old male who presents with altered mental status after smoking an unknown substance that he thought was marijuana.  Patient history and records reviewed.  On examination, the patient has normal vitals.  He is sleepy but easily arousable, and appears anxious and tachypneic.  He does have a prior history of panic attacks and states that he feels that the numbness and tightness in his chest is similar to this.   EKG and lab work including blood alcohol were obtained as above which were unremarkable.  His exam is otherwise atraumatic, and he has a normal neurologic exam without focal deficits.  The patient's altered mental status appears to be secondary to a bad drug reaction to the substance ingested, which may have been laced with something besides simply marijuana.  Possibilities include but are not limited to PCP, LSD.  He was given a dose of Ativan 0.5 mg IV, which seemed to calm him down but he remained sleepy.  Attempted to collect urine sample for urine drug screen, but the patient was incontinent of urine and was not able to stand and balance when attempted to walk the patient.  I suspect that he will likely require some time for the effects of the substance to wear off, and therefore care was signed out to Dr. Marcellus Baker as below.  Diagnosis:  Altered Mental Status    Disposition:  Signed out to Dr. Marcellus Baker for further evaluation and treatment.      Inge Hi  9/13/2018   [facility] EMERGENCY DEPARTMENT  IInge, am serving as a scribe at 8:08 PM on 9/13/2018 to document services personally performed by oBb Lam PA-C based on my observations and the provider's statements to me.        Bob Lam PA-C  09/13/18 9463

## 2018-09-14 NOTE — ED NOTES
"Patient was signed out to me at 2300.  Per report, he presented concerned about \"smoking some bad marijuana\" and felt it may have \"been laced with something.\"  Was markedly anxious, given ativan and went to sleep.  Plan was to check urine drug screen and monitor until he awoke and was safe for disposition.  Remainder of lab workup unrevealing.  Vitals normal without fever.  EtOH negative.  Unfortunately, at ~0010 the patient awoke and became combative and verbally abusive when asked to provide a urine sample.  This despite the only reason to check urine was for his information.  Code 21 called.  When I entered room patient was swearing and fighting with staff, needing physical restraint.  Unable to verbally de-escalate.  IM Ketamine 300mg and 0.5mg IV Ativan ordered with desired affect.  Plan to reassess when he is calmer and has had a chance to metabolize further.       Marcellus Baker MD  09/14/18 0020    "

## 2018-09-14 NOTE — ED NOTES
Pt remains arouseable to physical stimuli. No acute distress noted. Respirations regular and unlabored at 16 per minute. Oxygen saturations monitored.

## 2018-09-14 NOTE — ED NOTES
"Pt presents via EMS for complaint of altered mental status. EMS reports the patient called 911 approx 45 minutes prior to arrival stating he smoked an unknown substance from an individual he did not know. EMS states the patient has been \"in and out of consciousness\" during transport. EMS states the patient appeared to be holding his breath or \"stopped\" breathing for brief periods of time, but had spontaneous return of respirations. Arrives complaining of chest pain at this time. A&Ox4. ABC intact.  "

## 2018-09-14 NOTE — ED NOTES
Pt incontinent of urine. Changed into paper scrubs and bedding changed. Pt able to stand with standby assistance, unsteady on his feet. Offers no additional complaints at this time.

## 2018-09-14 NOTE — ED NOTES
Patient found vomiting on self, restraints removed per MD Baker verbal order. Patient calm and asleep. Oxygen saturation at 92 % RA , MD aware.     Linen changed and patient gown changed.   Significant other brought back to room

## 2018-09-14 NOTE — ED NOTES
Patient will wake up to voice, but quickly falls back asleep, I updated his girlfriend on the plan.  Once he's able to wake up and ambulate under his own power, he'll be clear for discharge.      Marcellus Baker MD  09/14/18 0573

## 2019-01-25 ENCOUNTER — HOSPITAL ENCOUNTER (EMERGENCY)
Facility: CLINIC | Age: 24
Discharge: HOME | End: 2019-01-25
Attending: EMERGENCY MEDICINE | Admitting: EMERGENCY MEDICINE
Payer: COMMERCIAL

## 2019-01-25 VITALS
WEIGHT: 130 LBS | DIASTOLIC BLOOD PRESSURE: 63 MMHG | TEMPERATURE: 97.5 F | BODY MASS INDEX: 18.61 KG/M2 | OXYGEN SATURATION: 99 % | RESPIRATION RATE: 24 BRPM | HEIGHT: 70 IN | SYSTOLIC BLOOD PRESSURE: 110 MMHG

## 2019-01-25 DIAGNOSIS — R19.7 VOMITING AND DIARRHEA: ICD-10-CM

## 2019-01-25 DIAGNOSIS — R10.84 ABDOMINAL PAIN, GENERALIZED: ICD-10-CM

## 2019-01-25 DIAGNOSIS — R11.10 VOMITING AND DIARRHEA: ICD-10-CM

## 2019-01-25 LAB
ALBUMIN SERPL-MCNC: 4 G/DL (ref 3.4–5)
ALP SERPL-CCNC: 106 U/L (ref 40–150)
ALT SERPL W P-5'-P-CCNC: 36 U/L (ref 0–70)
ANION GAP SERPL CALCULATED.3IONS-SCNC: 5 MMOL/L (ref 3–14)
AST SERPL W P-5'-P-CCNC: 30 U/L (ref 0–45)
BASOPHILS # BLD AUTO: 0 10E9/L (ref 0–0.2)
BASOPHILS NFR BLD AUTO: 0.1 %
BILIRUB SERPL-MCNC: 0.8 MG/DL (ref 0.2–1.3)
BUN SERPL-MCNC: 12 MG/DL (ref 7–30)
CALCIUM SERPL-MCNC: 8.9 MG/DL (ref 8.5–10.1)
CHLORIDE SERPL-SCNC: 102 MMOL/L (ref 94–109)
CO2 SERPL-SCNC: 30 MMOL/L (ref 20–32)
CREAT SERPL-MCNC: 0.67 MG/DL (ref 0.66–1.25)
DIFFERENTIAL METHOD BLD: ABNORMAL
EOSINOPHIL # BLD AUTO: 0 10E9/L (ref 0–0.7)
EOSINOPHIL NFR BLD AUTO: 0.3 %
ERYTHROCYTE [DISTWIDTH] IN BLOOD BY AUTOMATED COUNT: 13.3 % (ref 10–15)
GFR SERPL CREATININE-BSD FRML MDRD: >90 ML/MIN/{1.73_M2}
GLUCOSE SERPL-MCNC: 88 MG/DL (ref 70–99)
HCT VFR BLD AUTO: 50.1 % (ref 40–53)
HGB BLD-MCNC: 17.4 G/DL (ref 13.3–17.7)
IMM GRANULOCYTES # BLD: 0 10E9/L (ref 0–0.4)
IMM GRANULOCYTES NFR BLD: 0.2 %
LIPASE SERPL-CCNC: 81 U/L (ref 73–393)
LYMPHOCYTES # BLD AUTO: 0.8 10E9/L (ref 0.8–5.3)
LYMPHOCYTES NFR BLD AUTO: 5 %
MCH RBC QN AUTO: 31.2 PG (ref 26.5–33)
MCHC RBC AUTO-ENTMCNC: 34.7 G/DL (ref 31.5–36.5)
MCV RBC AUTO: 90 FL (ref 78–100)
MONOCYTES # BLD AUTO: 0.8 10E9/L (ref 0–1.3)
MONOCYTES NFR BLD AUTO: 5 %
NEUTROPHILS # BLD AUTO: 13.7 10E9/L (ref 1.6–8.3)
NEUTROPHILS NFR BLD AUTO: 89.4 %
NRBC # BLD AUTO: 0 10*3/UL
NRBC BLD AUTO-RTO: 0 /100
PLATELET # BLD AUTO: 209 10E9/L (ref 150–450)
POTASSIUM SERPL-SCNC: 4.2 MMOL/L (ref 3.4–5.3)
PROT SERPL-MCNC: 8.2 G/DL (ref 6.8–8.8)
RBC # BLD AUTO: 5.58 10E12/L (ref 4.4–5.9)
SODIUM SERPL-SCNC: 137 MMOL/L (ref 133–144)
WBC # BLD AUTO: 15.3 10E9/L (ref 4–11)

## 2019-01-25 PROCEDURE — 83690 ASSAY OF LIPASE: CPT | Performed by: EMERGENCY MEDICINE

## 2019-01-25 PROCEDURE — 96374 THER/PROPH/DIAG INJ IV PUSH: CPT

## 2019-01-25 PROCEDURE — 99284 EMERGENCY DEPT VISIT MOD MDM: CPT | Mod: 25

## 2019-01-25 PROCEDURE — 96361 HYDRATE IV INFUSION ADD-ON: CPT

## 2019-01-25 PROCEDURE — 85025 COMPLETE CBC W/AUTO DIFF WBC: CPT | Performed by: EMERGENCY MEDICINE

## 2019-01-25 PROCEDURE — 80053 COMPREHEN METABOLIC PANEL: CPT | Performed by: EMERGENCY MEDICINE

## 2019-01-25 PROCEDURE — 25000128 H RX IP 250 OP 636: Performed by: EMERGENCY MEDICINE

## 2019-01-25 RX ORDER — ONDANSETRON 4 MG/1
4 TABLET, ORALLY DISINTEGRATING ORAL EVERY 8 HOURS PRN
Qty: 10 TABLET | Refills: 0 | Status: SHIPPED | OUTPATIENT
Start: 2019-01-25 | End: 2019-01-28

## 2019-01-25 RX ORDER — ONDANSETRON 2 MG/ML
4 INJECTION INTRAMUSCULAR; INTRAVENOUS ONCE
Status: COMPLETED | OUTPATIENT
Start: 2019-01-25 | End: 2019-01-25

## 2019-01-25 RX ADMIN — SODIUM CHLORIDE 1000 ML: 9 INJECTION, SOLUTION INTRAVENOUS at 16:04

## 2019-01-25 RX ADMIN — SODIUM CHLORIDE 1000 ML: 9 INJECTION, SOLUTION INTRAVENOUS at 16:03

## 2019-01-25 RX ADMIN — ONDANSETRON 4 MG: 2 INJECTION INTRAMUSCULAR; INTRAVENOUS at 16:04

## 2019-01-25 ASSESSMENT — ENCOUNTER SYMPTOMS
ABDOMINAL PAIN: 1
DIARRHEA: 1
DIAPHORESIS: 1
NAUSEA: 1
FEVER: 1
VOMITING: 1

## 2019-01-25 ASSESSMENT — MIFFLIN-ST. JEOR: SCORE: 1590.93

## 2019-01-25 NOTE — ED AVS SNAPSHOT
Emergency Department  [address] [place] [address]  Phone:  [phone]  Fax:  [phone]                                    Nomi Miller   MRN: [id number]    Department:   Emergency Department   Date of Visit:  1/25/2019           After Visit Summary Signature Page    I have received my discharge instructions, and my questions have been answered. I have discussed any challenges I see with this plan with the nurse or doctor.    ..........................................................................................................................................  Patient/Patient Representative Signature      ..........................................................................................................................................  Patient Representative Print Name and Relationship to Patient    ..................................................               ................................................  Date                                   Time    ..........................................................................................................................................  Reviewed by Signature/Title    ...................................................              ..............................................  Date                                               Time          22EPIC Rev 08/18

## 2019-01-25 NOTE — ED PROVIDER NOTES
"  History     Chief Complaint:  Abdominal Pain    HPI   Nomi Miller is a 23 year old male who presents with abdominal pain. The patient reports that he developed generalized abdominal pain last night which has been accompanied by nausea, vomiting and diarrhea. He states he was additionally feverish and diaphoretic. He notes his symptoms have continued today, prompting his presentation to the ED for evaluation. He reports that he had cabbage and ham for dinner, but no one else who ate this is sick. He denies that he has used marijuana in the last few weeks but does have a history of use. Of note, the patient reports that he is his own legal guardian. He also states that he should probably be on seroquel and prozac, but has been off these medications for a couple years. No recent travel, antibiotics, alcohol.    Allergies:  No known drug allergies    Medications:    The patient is currently on no regular medications.     Past Medical History:    ADHD   Anxiety   Asthma   Borderline intellectual functioning   Depression   Marijuana use   Panic attacks   Reactive attachment disorder  Suicidal ideation    Past Surgical History:    History reviewed. No pertinent surgical history.    Family History:    History reviewed. No pertinent family history.     Social History:  The patient presents to the ED alone.  Marital status:   Smoking status: Current Every Day Smoker; 0.5 pack/day  Alcohol use: Yes  Drug use: Yes; Marijuana    Review of Systems   Constitutional: Positive for diaphoresis and fever.   Gastrointestinal: Positive for abdominal pain, diarrhea, nausea and vomiting.   All other systems reviewed and are negative.    Physical Exam     Patient Vitals for the past 24 hrs:   BP Temp Temp src Heart Rate Resp SpO2 Height Weight   01/25/19 1854 110/63 -- -- 88 -- 99 % -- --   01/25/19 1502 (!) 116/101 97.5  F (36.4  C) Temporal 85 24 100 % 1.778 m (5' 10\") 59 kg (130 lb)       Physical Exam    Physical " Exam   Constitutional:  Patient laying in fetal position. Mild distress secondary to nausea.  HENT:   Mouth/Throat:   Oropharynx is clear and moist.   Eyes:    Conjunctivae normal and EOM are normal. Pupils are equal, round, and reactive to light.   Neck:    Normal range of motion.   Cardiovascular: Normal rate, regular rhythm and normal heart sounds.  Exam reveals no gallop and no friction rub.  No murmur heard.  Pulmonary/Chest:  Effort normal and breath sounds normal. Patient has no wheezes. Patient has no rales.   Abdominal:   Soft. Very thin. Hypoactive bowel sounds. No masses. Generalized discomfort. There is no rebound and no guarding.   Musculoskeletal:  Normal range of motion. Patient exhibits no edema.   Neurological:   Patient is alert and oriented to person, place, and time. Patient has normal strength. No cranial nerve deficit or sensory deficit. GCS 15  Skin:   Skin is warm and dry. No rash noted. No erythema.   Psychiatric:   Patient has a normal mood and affect. Patient's behavior is normal. Judgment and thought content normal.     Emergency Department Course     Laboratory:  CBC: WBC 15.3 (H), o/w WNL (HGB 17.4, )  CMP: WNL (Creatinine 0.67)  Lipase: 81    Interventions:  1603: NS 1L IV Bolus  1604: NS 1L IV Bolus  1604: Zofran 4 mg IV    Emergency Department Course:  Past medical records, nursing notes, and vitals reviewed.  1523: I performed an exam of the patient and obtained history, as documented above.   IV inserted and blood samples were collected and sent for laboratory testing, findings above.    1753: I rechecked the patient and explained the findings.    1855: I rechecked the patient. He complains of continued abdominal discomfort, but declines imaging. He states he would prefer to go home at this time.    1901: I rechecked the patient.    Findings and plan explained to the patient. Patient discharged home with instructions regarding supportive care, medications, and reasons to  return. The importance of close follow-up was reviewed.     Impression & Plan      Medical Decision Making:  Nomi Miller is a 23 year old male who presents with vomiting and diarrhea which started last night. He has generalized abdominal discomfort on his initial exam. His blood work does show leukocytosis. Certainly the etiology of this could be demargination versus infection versus inflammation. He has no history of bowel disease or previous surgeries. He has not been on any recent antibiotics. He has no metabolic derangement. His liver and pancreas are normal. He received IV fluids and the above antiemetic and has not had any vomiting here. He is afebrile here. His initial blood pressure was high so this was rechecked and was normal. He is not tachycardic.    I did repeat an abdominal exam after test results were back and at that point he just indicated generalized lower abdominal discomfort. He seemed to have pain slightly to the right of midline so I discussed with him doing a CAT scan to evaluate for appendicitis. Patient declined getting this test and at this time is ready to go home. He has tolerated an oral fluid challenge here. At this point with him unwilling to proceed with any further evaluation and no further vomiting I do not feel that he needs to be admitted. Therefore I discharged him home with specific instructions for return to the Emergency Department if he is unable to keep fluid down, develops worsening pain, or if he develops fever. I refer him to primary care doctor for follow up and write him for antiemetics.     Diagnosis:    ICD-10-CM   1. Vomiting and diarrhea R11.10    R19.7   2. Abdominal pain, generalized R10.84       Disposition:  discharged to home    Discharge Medications:     Medication List      Started    ondansetron 4 MG ODT tab  Commonly known as:  ZOFRAN ODT  4 mg, Oral, EVERY 8 HOURS PRN          Megha Henriquez  1/25/2019    EMERGENCY DEPARTMENT  Megha AWDA  [place], am serving as a scribe at 3:23 PM on 1/25/2019 to document services personally performed by Lacey Iverson MD based on my observations and the provider's statements to me.       Lacey Iverson MD  01/26/19 1833

## 2020-02-27 ENCOUNTER — APPOINTMENT (OUTPATIENT)
Dept: GENERAL RADIOLOGY | Facility: CLINIC | Age: 25
End: 2020-02-27
Attending: EMERGENCY MEDICINE
Payer: COMMERCIAL

## 2020-02-27 ENCOUNTER — HOSPITAL ENCOUNTER (EMERGENCY)
Facility: CLINIC | Age: 25
Discharge: HOME | End: 2020-02-27
Attending: EMERGENCY MEDICINE | Admitting: EMERGENCY MEDICINE
Payer: COMMERCIAL

## 2020-02-27 ENCOUNTER — APPOINTMENT (OUTPATIENT)
Dept: CARDIOLOGY | Facility: CLINIC | Age: 25
End: 2020-02-27
Attending: EMERGENCY MEDICINE
Payer: COMMERCIAL

## 2020-02-27 VITALS
SYSTOLIC BLOOD PRESSURE: 106 MMHG | RESPIRATION RATE: 16 BRPM | WEIGHT: 180 LBS | DIASTOLIC BLOOD PRESSURE: 80 MMHG | BODY MASS INDEX: 25.83 KG/M2 | OXYGEN SATURATION: 98 % | TEMPERATURE: 98 F | HEART RATE: 124 BPM

## 2020-02-27 DIAGNOSIS — L02.413 ABSCESS OF ARM, RIGHT: ICD-10-CM

## 2020-02-27 LAB
ANION GAP SERPL CALCULATED.3IONS-SCNC: 7 MMOL/L (ref 3–14)
BASOPHILS # BLD AUTO: 0.1 10E9/L (ref 0–0.2)
BASOPHILS NFR BLD AUTO: 0.5 %
BUN SERPL-MCNC: 11 MG/DL (ref 7–30)
CALCIUM SERPL-MCNC: 9.2 MG/DL (ref 8.5–10.1)
CHLORIDE SERPL-SCNC: 104 MMOL/L (ref 94–109)
CO2 SERPL-SCNC: 26 MMOL/L (ref 20–32)
CREAT SERPL-MCNC: 0.76 MG/DL (ref 0.66–1.25)
DIFFERENTIAL METHOD BLD: ABNORMAL
EOSINOPHIL # BLD AUTO: 0.1 10E9/L (ref 0–0.7)
EOSINOPHIL NFR BLD AUTO: 0.6 %
ERYTHROCYTE [DISTWIDTH] IN BLOOD BY AUTOMATED COUNT: 13.5 % (ref 10–15)
GFR SERPL CREATININE-BSD FRML MDRD: >90 ML/MIN/{1.73_M2}
GLUCOSE SERPL-MCNC: 71 MG/DL (ref 70–99)
HCT VFR BLD AUTO: 41.8 % (ref 40–53)
HGB BLD-MCNC: 13.8 G/DL (ref 13.3–17.7)
IMM GRANULOCYTES # BLD: 0.1 10E9/L (ref 0–0.4)
IMM GRANULOCYTES NFR BLD: 0.4 %
LYMPHOCYTES # BLD AUTO: 2.7 10E9/L (ref 0.8–5.3)
LYMPHOCYTES NFR BLD AUTO: 19.7 %
MCH RBC QN AUTO: 29.2 PG (ref 26.5–33)
MCHC RBC AUTO-ENTMCNC: 33 G/DL (ref 31.5–36.5)
MCV RBC AUTO: 88 FL (ref 78–100)
MONOCYTES # BLD AUTO: 1.3 10E9/L (ref 0–1.3)
MONOCYTES NFR BLD AUTO: 9 %
NEUTROPHILS # BLD AUTO: 9.7 10E9/L (ref 1.6–8.3)
NEUTROPHILS NFR BLD AUTO: 69.8 %
NRBC # BLD AUTO: 0 10*3/UL
NRBC BLD AUTO-RTO: 0 /100
PLATELET # BLD AUTO: 284 10E9/L (ref 150–450)
POTASSIUM SERPL-SCNC: 3.7 MMOL/L (ref 3.4–5.3)
RBC # BLD AUTO: 4.73 10E12/L (ref 4.4–5.9)
SODIUM SERPL-SCNC: 137 MMOL/L (ref 133–144)
WBC # BLD AUTO: 13.8 10E9/L (ref 4–11)

## 2020-02-27 PROCEDURE — 73090 X-RAY EXAM OF FOREARM: CPT | Mod: RT

## 2020-02-27 PROCEDURE — 71046 X-RAY EXAM CHEST 2 VIEWS: CPT

## 2020-02-27 PROCEDURE — 93306 TTE W/DOPPLER COMPLETE: CPT

## 2020-02-27 PROCEDURE — 99285 EMERGENCY DEPT VISIT HI MDM: CPT | Mod: 25

## 2020-02-27 PROCEDURE — 87040 BLOOD CULTURE FOR BACTERIA: CPT | Performed by: EMERGENCY MEDICINE

## 2020-02-27 PROCEDURE — 96375 TX/PRO/DX INJ NEW DRUG ADDON: CPT | Mod: 59

## 2020-02-27 PROCEDURE — 36415 COLL VENOUS BLD VENIPUNCTURE: CPT | Performed by: EMERGENCY MEDICINE

## 2020-02-27 PROCEDURE — 25000125 ZZHC RX 250: Performed by: EMERGENCY MEDICINE

## 2020-02-27 PROCEDURE — 87800 DETECT AGNT MULT DNA DIREC: CPT | Performed by: EMERGENCY MEDICINE

## 2020-02-27 PROCEDURE — 10060 I&D ABSCESS SIMPLE/SINGLE: CPT

## 2020-02-27 PROCEDURE — 96365 THER/PROPH/DIAG IV INF INIT: CPT | Mod: 59

## 2020-02-27 PROCEDURE — 80048 BASIC METABOLIC PNL TOTAL CA: CPT | Performed by: EMERGENCY MEDICINE

## 2020-02-27 PROCEDURE — 85025 COMPLETE CBC W/AUTO DIFF WBC: CPT | Performed by: EMERGENCY MEDICINE

## 2020-02-27 PROCEDURE — 93306 TTE W/DOPPLER COMPLETE: CPT | Mod: 26 | Performed by: INTERNAL MEDICINE

## 2020-02-27 PROCEDURE — 25000128 H RX IP 250 OP 636: Performed by: EMERGENCY MEDICINE

## 2020-02-27 PROCEDURE — 87186 SC STD MICRODIL/AGAR DIL: CPT | Performed by: EMERGENCY MEDICINE

## 2020-02-27 PROCEDURE — 96366 THER/PROPH/DIAG IV INF ADDON: CPT | Mod: 59

## 2020-02-27 PROCEDURE — 87077 CULTURE AEROBIC IDENTIFY: CPT | Performed by: EMERGENCY MEDICINE

## 2020-02-27 RX ORDER — CLINDAMYCIN HCL 150 MG
300 CAPSULE ORAL 4 TIMES DAILY
Qty: 80 CAPSULE | Refills: 0 | Status: SHIPPED | OUTPATIENT
Start: 2020-02-27 | End: 2020-03-08

## 2020-02-27 RX ORDER — CLINDAMYCIN PHOSPHATE 900 MG/50ML
900 INJECTION, SOLUTION INTRAVENOUS ONCE
Status: COMPLETED | OUTPATIENT
Start: 2020-02-27 | End: 2020-02-27

## 2020-02-27 RX ORDER — HYDROCODONE BITARTRATE AND ACETAMINOPHEN 5; 325 MG/1; MG/1
1 TABLET ORAL EVERY 6 HOURS PRN
Qty: 10 TABLET | Refills: 0 | Status: SHIPPED | OUTPATIENT
Start: 2020-02-27 | End: 2020-03-01

## 2020-02-27 RX ORDER — HYDROMORPHONE HYDROCHLORIDE 1 MG/ML
0.5 INJECTION, SOLUTION INTRAMUSCULAR; INTRAVENOUS; SUBCUTANEOUS
Status: DISCONTINUED | OUTPATIENT
Start: 2020-02-27 | End: 2020-02-27 | Stop reason: HOSPADM

## 2020-02-27 RX ADMIN — HYDROMORPHONE HYDROCHLORIDE 0.5 MG: 1 INJECTION, SOLUTION INTRAMUSCULAR; INTRAVENOUS; SUBCUTANEOUS at 11:02

## 2020-02-27 RX ADMIN — HYDROMORPHONE HYDROCHLORIDE 0.5 MG: 1 INJECTION, SOLUTION INTRAMUSCULAR; INTRAVENOUS; SUBCUTANEOUS at 11:21

## 2020-02-27 RX ADMIN — CLINDAMYCIN PHOSPHATE 900 MG: 900 INJECTION, SOLUTION INTRAVENOUS at 11:02

## 2020-02-27 ASSESSMENT — ENCOUNTER SYMPTOMS
CHILLS: 1
SHORTNESS OF BREATH: 1
WOUND: 1

## 2020-02-27 NOTE — ED AVS SNAPSHOT
[facility] Emergency Department  [address]  [facility] [address]  Phone:  [phone]  Fax:  [phone]                                    Nomi Miller   MRN: [id number]    Department:  [facility] Emergency Department   Date of Visit:  2/27/2020           After Visit Summary Signature Page    I have received my discharge instructions, and my questions have been answered. I have discussed any challenges I see with this plan with the nurse or doctor.    ..........................................................................................................................................  Patient/Patient Representative Signature      ..........................................................................................................................................  Patient Representative Print Name and Relationship to Patient    ..................................................               ................................................  Date                                   Time    ..........................................................................................................................................  Reviewed by Signature/Title    ...................................................              ..............................................  Date                                               Time          22EPIC Rev 08/18

## 2020-02-27 NOTE — ED NOTES
Upon writer arrival in the room, pt asleep. Writer awoke pt. Pt sclera eyes reddened, slurred speech, and unable to focus gaze. Writer stressed taking full antibiotics and the importance of f/u care. Pt verbalized understanding but needs reinforcement. A/O. VSS. PIV removed. Rehab drug program services information offered; pt declined.  Script rx antibiotic and norco given to pt at time of d/c

## 2020-02-27 NOTE — ED PROVIDER NOTES
History     Chief Complaint:  Wound Check    HPI   Nomi Cesar Miller is a 24 year old male with a history of poly substance abuse who presents to the emergency department for evaluation of a wound. The patient reports that 3 days ago he was injecting himself with meth in his right forearm. The area then began swelling and very red. He states that he also has sharp pains in the area. The patient also notes that he has had chest pain, chills, and shortness of breath that all began today. He states that his father had a valve replacement. The patient states that about 2 years ago he was told that he might need a valve replacement and he had an ultrasound of his heart done a few years ago. He has had similar bumps in the past, but never this red or large.    Allergies:  No Known Drug Allergies     Medications:    Wellbutrin  Minipress  Seroquel  Narcan  Augmentin    Past Medical History:    ADHD  Anxiety  Asthma  Borderline intellectual functioning  Depression  Marijuana use  Panic attacks  Reactive attachment disorder  Suicidal ideation  Heroin overdose  Substance abuse  Chlamydia  Gonorrhea  Amblyopia    Past Surgical History:    History reviewed. No pertinent past surgical history.     Family History:    Other - depression, substance abuse, psychotic disorder, ADHD    Social History:  The patient was accompanied to the ED by his girlfriend.  Smoking Status: Yes - current every day smoker  Smokeless Tobacco: No  Alcohol Use: Yes  Drug Use: Yes   Marital Status:  Single    Review of Systems   Constitutional: Positive for chills.   Respiratory: Positive for shortness of breath.    Cardiovascular: Positive for chest pain.   Skin: Positive for wound.   All other systems reviewed and are negative.      Physical Exam     Patient Vitals for the past 24 hrs:   BP Temp Temp src Heart Rate Resp SpO2 Weight   02/27/20 0947 (!) 126/92 98  F (36.7  C) Temporal 96 16 100 % 81.6 kg (180 lb)       Physical  Exam  Constitutional: Patient is well appearing. No distress.  Head: Atraumatic.  Mouth/Throat: Oropharynx is clear and moist. No oropharyngeal exudate.  Eyes: Conjunctivae and EOM are normal. No scleral icterus.  Neck: Normal range of motion. Neck supple.   Cardiovascular: Normal rate, regular rhythm, normal heart sounds and intact distal pulses. No murmurs.  Pulmonary/Chest: Breath sounds normal. No respiratory distress.  Abdominal: Soft. Bowel sounds are normal. No distension. No tenderness. No rebound or guarding.   Musculoskeletal: Normal range of motion. No edema or tenderness.   Neurological: Alert and orientated to person, place, and time. No observable focal neuro deficit  Skin: Midshaft lateral right forearm silver dollar sized large raised abscess, fluctuant and tense, with surrounding erythema approximately 6 cm. Neurovascular and tendon intact distal without pain.     Emergency Department Course     Imaging:  Radiology findings were communicated with the patient who voiced understanding of the findings.    XR Radius/Ulna PA & LAT Right  No bony abnormalities. Subcutaneous edema at the radial  and volar forearm with a focus of soft tissue swelling at the mid  forearm level. No soft tissue gas.  Reading per radiology.    XR Chest 2 Views  No radiographic evidence of acute chest abnormality.   Reading per radiology.     Echocardiogram Complete  Left ventricular systolic function is normal.  The visual ejection fraction is estimated at 55-60%.  No regional wall motion abnormalities noted.  The right ventricular systolic function is normal.  There is no comparison study available.  Reading per radiology.    Laboratory:  CBC: WBC 13.8 (H) o/w WNL. (HGB 13.8, )   BMP: AWNL (Creatinine 0.76)    Blood Culture x2: Pending    Procedures:    Incision and Drainage     LOCATIONS:  Midshaft of right forearm     ANESTHESIA:  I offered to numb the area, but the patient chose to go ahead with the incision without  anesthesia.     PREPARATION:  Cleansed with Normal Saline and Shur Clens     PROCEDURE:  Area was incised with # 11 Blade ([place]) with a Single Straight incision.  Wound treatment included 5 ml of green/brown Purulent Drainage.  Packing consisted of No Packing.  Appropriate dressing was applied to cover the area.    PATIENT STATUS:        Patient tolerated the procedure well. There were no complications.    Interventions:  1102 Dilaudid 0.5 mg IV  1102 Cleocin 900 mg IV    Emergency Department Course:  Past medical records, nursing notes, and vitals reviewed.    1010 I performed an exam of the patient as documented above.     IV was inserted and blood was drawn for laboratory testing, results above.  The patient was sent for a radius/ulna XR, echocardiogram and chest XR while in the emergency department, results above.     1114 I rechecked the patient and discussed the results of his workup thus far. I preformed the incision and drainage as described above.    1310 I rechecked and updated the patient.     Findings and plan explained to the Patient. Patient discharged home with instructions regarding supportive care, medications, and reasons to return. The importance of close follow-up was reviewed. The patient was prescribed Cleocin and Norco.    I personally reviewed the laboratory results with the Patient and answered all related questions prior to discharge.    Impression & Plan     Medical Decision Making:  Nomi Miller is a 24 year old male who presents for evaluation of skin redness fluctuance and significant abscess. There do not appear at this time to be any complication of cellulitis including necrotizing fascitis, lymphangitis, lymphadenitis, abscess, osteomyelitis, sepsis, or shock. An incision and drainage of the abcess was done as outlined above. The patient is not immunosuppressed but is IVDA.  Patient refused against my better judgement and my full explanation of risk or morbidity and  mortalit to be admitted for this and the constellation of sx he has.  He was at least agreeable to bedside echo considering hx with results above.   Discharged at his wishes with strict return and f/u given.     Diagnosis:    ICD-10-CM    1. Abscess of arm, right L02.413        Disposition:  Discharged to home.    Discharge Medications:  New Prescriptions    CLINDAMYCIN (CLEOCIN) 150 MG CAPSULE    Take 2 capsules (300 mg) by mouth 4 times daily for 10 days    HYDROCODONE-ACETAMINOPHEN (NORCO) 5-325 MG TABLET    Take 1 tablet by mouth every 6 hours as needed for severe pain       Scribe Disclosure:  I, Elder Hammer, am serving as a scribe at 9:53 AM on 2/27/2020 to document services personally performed by Arnav Terry MD based on my observations and the provider's statements to me.     2/27/2020   [facility] EMERGENCY DEPARTMENT       Arnav Terry MD  02/27/20 7033

## 2020-02-28 NOTE — ED PROVIDER NOTES
Patient was seen and evaluated for an abscess of his left arm associated with IV drug use who came in with chest pain, chills underwent I&D as well as imaging of his chest and extremity including echo without clear evidence of vegetation though this was not a EVANS.  Was contacted today that 1 of 2 culture bottles is growing gram-positive cocci.  Certainly this could be contamination as it is only 1 of 2.  Patient was discharged on clindamycin.  I recommended waiting to see if other culture bottle turns positive.    Marcellus Colon MD  [employer]  2:00 PM 02/28/20        Marcellus Colon MD  02/28/20 1400

## 2020-03-01 LAB
BACTERIA SPEC CULT: ABNORMAL
SPECIMEN SOURCE: ABNORMAL

## 2020-03-04 ENCOUNTER — TELEPHONE (OUTPATIENT)
Dept: EMERGENCY MEDICINE | Facility: CLINIC | Age: 25
End: 2020-03-04

## 2020-03-04 LAB
BACTERIA SPEC CULT: NO GROWTH
SPECIMEN SOURCE: NORMAL

## 2020-03-04 NOTE — TELEPHONE ENCOUNTER
"[facility] Emergency Department Lab result notification:    Reason for call  Blood culture protocol    Lab Result  Emergency Dept discharge antibiotic prescribed: Clindamycin (Cleocin) 300 mg PO capsule, 1 capsule (300 mg) by mouth 4 times daily for 10 days  Final BLOOD culture on 3/1/20 shows the presence of bacteria(s): Staphylococcus epidermidis , which is [SUSCEPTIBLE] to ED discharge antibiotic.  Patient to be notified of result, symptoms's assessed and advised per [facility] ED lab result protocol    ED visit Date: 2/27/20  Symptoms reported at ED visit Nomi Miller is a 24 year old male with a history of poly substance abuse who presents to the emergency department for evaluation of a wound. The patient reports that 3 days ago he was injecting himself with meth in his right forearm. The area then began swelling and very red. He states that he also has sharp pains in the area. The patient also notes that he has had chest pain, chills, and shortness of breath that all began today. He states that his father had a valve replacement. The patient states that about 2 years ago he was told that he might need a valve replacement and he had an ultrasound of his heart done a few years ago. He has had similar bumps in the past, but never this red or large.   Miscellaneous information      Current symptoms  Nomi doing \"pretty good\", arm is \"healing up\".    Recommendations/Instructions  To continue with plan of care.    Contact your PCP clinic or return to the Emergency department if your:    Symptoms return.    Symptoms do not resolve after completing antibiotic.    Symptoms worsen or other concerning symptom's.    Shadia Hdz RN    [employer] [place]   Lung Nodule and ED Lab Results F/U RN  Epic pool (ED late result f/u RN) : P [id number]   # [phone]    Copy of Lab result   Order   Blood culture [[id number]] (Order [id number])   Order Requisition     Blood culture (Order #[id number]) on 2/27/20 "   Exam Information     Exam Date Exam Time Accession # Results    2/27/20 11:30 AM [id number]    Component Results     Specimen Information: Blood        Component Collected Lab   Specimen Description 02/27/2020 11:30    Blood Right Hand    Culture Micro Abnormal  02/27/2020 11:30    Cultured on the 1st day of incubation:   Staphylococcus epidermidis    Culture Micro 02/27/2020 11:30    Critical Value/Significant Value, preliminary result only, called to and read back by   DR. KAYLIE AWAN AT 1359 2.28.20. AMD    Culture Micro 02/27/2020 11:30    (Note)   POSITIVE for STAPHYLOCOCCUS EPIDERMIDIS and NEGATIVE for the mecA   gene (not resistant to methicillin) by Verigene nucleic acid test.   The mecA gene was not detected. Final identification and   antimicrobial susceptibility testing will be verified by standard   methods.     Specimen tested with Verigene multiplex, gram-positive blood culture   nucleic acid test for the following targets: Staph aureus, Staph   epidermidis, Staph lugdunensis, other Staph species, Enterococcus   faecalis, Enterococcus faecium, Streptococcus species, S. agalactiae,   S. anginosus grp., S. pneumoniae, S. pyogenes, Listeria sp., mecA   (methicillin resistance) and Darion/B (vancomycin resistance).     Critical Value/Significant Value called to and read back by Claudia Bacon RN on 2.28.20at 1648. JRT    Susceptibility     Staphylococcus epidermidis     Antibiotic Interpretation Sensitivity Method Status   CIPROFLOXACIN Sensitive <=0.5 ug/mL GRUPO Final   CLINDAMYCIN Sensitive <=0.25 ug/mL GRUPO Final    This isolate DOES NOT demonstrate inducible clindamycin resistance in vitro.   Clindamycin is susceptible and could be used when indicated, however,   erythromycin is resistant and should not be used.   ERYTHROMYCIN Resistant >=8 ug/mL GRUPO Final   GENTAMICIN Sensitive <=0.5 ug/mL GRUPO Final   LEVOFLOXACIN Sensitive <=0.12 ug/mL GRUPO Final   OXACILLIN Sensitive  <=0.25 ug/mL GRUPO Final   TETRACYCLINE Sensitive <=1 ug/mL GRUPO Final   VANCOMYCIN Sensitive 2 ug/mL GRUPO Final

## 2020-10-26 ENCOUNTER — HOSPITAL ENCOUNTER (OUTPATIENT)
Dept: BEHAVIORAL HEALTH | Facility: CLINIC | Age: 25
Discharge: HOME | End: 2020-10-26
Attending: FAMILY MEDICINE | Admitting: FAMILY MEDICINE
Payer: MEDICAID

## 2020-10-26 VITALS — BODY MASS INDEX: 21.47 KG/M2 | WEIGHT: 150 LBS | HEIGHT: 70 IN

## 2020-10-26 PROCEDURE — H0001 ALCOHOL AND/OR DRUG ASSESS: HCPCS | Mod: TEL

## 2020-10-26 RX ORDER — QUETIAPINE FUMARATE 100 MG/1
TABLET, FILM COATED ORAL
COMMUNITY
Start: 2019-11-06 | End: 2021-11-08

## 2020-10-26 RX ORDER — PRAZOSIN HYDROCHLORIDE 2 MG/1
CAPSULE ORAL
COMMUNITY
Start: 2020-09-25

## 2020-10-26 RX ORDER — BUPROPION HYDROCHLORIDE 300 MG/1
300 TABLET ORAL
COMMUNITY
Start: 2020-10-02 | End: 2020-12-01

## 2020-10-26 RX ORDER — ACETAMINOPHEN 325 MG/1
650 TABLET ORAL
COMMUNITY
Start: 2019-07-06

## 2020-10-26 RX ORDER — QUETIAPINE FUMARATE 50 MG/1
TABLET, FILM COATED ORAL
COMMUNITY
Start: 2020-10-02 | End: 2021-11-08

## 2020-10-26 RX ORDER — BUPROPION HYDROCHLORIDE 300 MG/1
TABLET ORAL
COMMUNITY
Start: 2020-09-25 | End: 2022-11-17

## 2020-10-26 RX ORDER — QUETIAPINE FUMARATE 50 MG/1
TABLET, FILM COATED ORAL
COMMUNITY
Start: 2020-09-25 | End: 2021-11-08

## 2020-10-26 RX ORDER — BUPROPION HYDROCHLORIDE 150 MG/1
150 TABLET ORAL
COMMUNITY
Start: 2020-10-02 | End: 2022-11-17

## 2020-10-26 ASSESSMENT — ANXIETY QUESTIONNAIRES
5. BEING SO RESTLESS THAT IT IS HARD TO SIT STILL: NEARLY EVERY DAY
2. NOT BEING ABLE TO STOP OR CONTROL WORRYING: SEVERAL DAYS
IF YOU CHECKED OFF ANY PROBLEMS ON THIS QUESTIONNAIRE, HOW DIFFICULT HAVE THESE PROBLEMS MADE IT FOR YOU TO DO YOUR WORK, TAKE CARE OF THINGS AT HOME, OR GET ALONG WITH OTHER PEOPLE: SOMEWHAT DIFFICULT
7. FEELING AFRAID AS IF SOMETHING AWFUL MIGHT HAPPEN: MORE THAN HALF THE DAYS
GAD7 TOTAL SCORE: 16
4. TROUBLE RELAXING: NEARLY EVERY DAY
3. WORRYING TOO MUCH ABOUT DIFFERENT THINGS: MORE THAN HALF THE DAYS
1. FEELING NERVOUS, ANXIOUS, OR ON EDGE: MORE THAN HALF THE DAYS
6. BECOMING EASILY ANNOYED OR IRRITABLE: NEARLY EVERY DAY

## 2020-10-26 ASSESSMENT — PAIN SCALES - GENERAL: PAINLEVEL: NO PAIN (0)

## 2020-10-26 ASSESSMENT — MIFFLIN-ST. JEOR: SCORE: 1671.65

## 2020-10-26 ASSESSMENT — PATIENT HEALTH QUESTIONNAIRE - PHQ9: SUM OF ALL RESPONSES TO PHQ QUESTIONS 1-9: 17

## 2020-10-26 NOTE — PROGRESS NOTES
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 and what arrangements are made for supervision for the children when the person is not available.) Girlfriend has two children.    6B. Do you have children who do not live with you?     Yes.  (Ask follow-up questions to determine the person's relationship and responsibility, both legal and care giving; and what arrangements are made for supervision for the children when the person is not available.) A 5 year old daughter in foster care    7A. Who supports you in making changes in your alcohol or drug use? What are they willing to do to support you? Who is upset or angry about you making changes in your alcohol or drug use? How big a problem is this for you?      My girlfriend, my mom and my little brothers.    7B. This table is provided to record information about the person s relationships and available support It is not necessary to ask each item; only to get a comprehensive picture of their support system.  How often can you count on the following people when you need someone?   Partner / Spouse Always supportive   Parent(s)/Aunt(s)/Uncle(s)/Grandparents Always supportive   Sibling(s)/Cousin(s) Usually supportive   Child(gosia) Always supportive   Other relative(s) Usually supportive   Friend(s)/neighbor(s) The  patient doesn't have any friends at this time.   Child(gosia) s father(s)/mother(s) Usually supportive   Support group member(s) The patient denied having any current involvement with 12-step or other support group meetings.   Community of selam members The patient denied having any current involvement with community selam members.   /counselor/therapist/healer Always supportive   Other (specify) No     8A. What is your current living situation?     Patient lives with his girlfriend and her two children.    8B. What is your long term plan for where you will be living?     Eventually, I plan to go back to [place].    8C. Tell me about your living environment/neighborhood? Ask enough follow up questions to determine safety, criminal activity, availability of alcohol and drugs, supportive or antagonistic to the person making changes.      Safe living environment. No concerns.    9. Criminal justice history: Gather current/recent history and any significant history related to substance use--Arrests? Convictions? Circumstances? Alcohol or drug involvement? Sentences? Still on probation or parole? Expectations of the court? Current court order? Any sex offenses - lifetime? What level? (DSM)    Drug possession, credit cards frauds, shoplifting and currently on probation in [facility]    10. What obstacles exist to participating in treatment? (Time off work, childcare, funding, transportation, pending shelter time, living situation)     The patient denied having any obstacles for participating in substance abuse treatment.    Dimension VI Ratings   Recovery environment - The placing authority must use the criteria in Dimension VI to determine a client s recovery environment.   RISK DESCRIPTIONS - Severity rating: 3 Client is not engaged in structured, meaningful activity and the client's peers, family, significant other, and living environment are unsupportive, or there is significant criminal justice system  involvement.    REASONS SEVERITY WAS ASSIGNED - (What support does the person have for making changes? What structure/stability does the person have in his or her daily life that will increase the likelihood that changes can be sustained? What problems exist in the person s environment that will jeopardize getting/staying clean and sober?)     Patient lives with his girlfriend and her two children, unemployed with no source of income, has a GED. Pt is currently in a relationship with someone who is sober. Pt reports that his 5 years old daughter is in foster care. Pt reports that her mother and little brother are supportive. Pt lacks sober support and needs to build a support network. He is currently on supervised probation at [facility].         Client Choice/Exceptions   Would you like services specific to language, age, gender, culture, Bahai preference, race, ethnicity, sexual orientation or disability?  No    What particular treatment choices and options would you like to have? Inpatient    Do you have a preference for a particular treatment program? Open    Criteria for Diagnosis     Criteria for Diagnosis  DSM-5 Criteria for Substance Use Disorder  Instructions: Determine whether the client currently meets the criteria for Substance Use Disorder using the diagnostic criteria in the DSM-V pp.[phone]. Current means during the most recent 12 months outside a facility that controls access to substances    Category of Substance Severity (ICD-10 Code / DSM 5 Code)     Alcohol Use Disorder The patient does not meet the criteria for an Alcohol use disorder.   Cannabis Use Disorder Severe   (F12.20) (304.30)   Hallucinogen Use Disorder The patient does not meet the criteria for a Hallucinogen use disorder.   Inhalant Use Disorder The patient does not meet the criteria for an Inhalant use disorder.   Opioid Use Disorder Mild   (F11.10) (305.50)   Sedative, Hypnotic, or Anxiolytic Use Disorder The patient does  not meet the criteria for a Sedative/Hypnotic use disorder.   Stimulant Related Disorder Severe   (F15.20) (304.40) Amphetamine type substance   Tobacco Use Disorder Mild    (Z72.0) (305.1)   Other (or unknown) Substance Use Disorder The patient does not meet the criteria for a Other (or unknown) Substance use disorder.       Collateral Contact Summary   Number of contacts made: 2    Contact with referring person:  No    If court related records were reviewed, summarize here: No court records had been reviewed at the time of this documentation.    Information from collateral contacts supported/largely agreed with information from the client and associated risk ratings.      Rule 25 Assessment Summary and Plan   's Recommendation    1) Complete a residential based or similar treatment program, such as [facility]: [place], [place].   2) Abstain from all mood-altering chemicals unless prescribed by a licensed provider.   3) Attend, at minimum, 2 weekly support group meetings, such as 12 step based (AA/NA), SMART Recovery, Health Realizations, and/or Refuge Recovery meetings.     4) Actively work with a male mentor/sponsor on a weekly basis.   5) Remain law abiding and follow all recommendations of the Courts/PO.  6) Patient may benefit from a mental health assessment and individual psychotherapy.          Collateral Contacts     Name:    Carmine Cormier   Relationship:    Probation   Phone Number:    Ph: [phone]  Fax:[phone]   Releases:    Yes     LVM for PO requesting collateral information.  Spoke to PO on 10/27/20 at 7:50 am, he states that he started working with patient in August 2020, patient is facing a gross-misdemeanor charge of drug possession. He reports no drugs test at [facility] due to the pandemic, and patient DOC is meth and marijuana and that patient has had prior rule 25 early this year where he was recommended to inpatient but failed to follow through with  recommendations.        Collateral Contacts     Name:    Katharina Pruitt   Relationship:     at [employer]   Phone Number:    [phone]   Releases:    Yes     Spoke to CM, she states that she just started working with patient, and he had expressed concerned with substances and if he would desire to do treatment that would be great.  ollateral Contacts      A problematic pattern of alcohol/drug use leading to clinically significant impairment or distress, as manifested by at least two of the following, occurring within a 12-month period:    1.) Alcohol/drug is often taken in larger amounts or over a longer period than was intended.  2.) There is a persistent desire or unsuccessful efforts to cut down or control alcohol/drug use  3.) A great deal of time is spent in activities necessary to obtain alcohol, use alcohol, or recover from its effects.  5.) Recurrent alcohol/drug use resulting in a failure to fulfill major role obligations at work, school or home.  6.) Continued alcohol use despite having persistent or recurrent social or interpersonal problems caused or exacerbated by the effects of alcohol/drug.  7.) Important social, occupational, or recreational activities are given up or reduced because of alcohol/drug use.  8.) Recurrent alcohol/drug use in situations in which it is physically hazardous.  9.) Alcohol/drug use is continued despite knowledge of having a persistent or recurrent physical or psychological problem that is likely to have been caused or exacerbated by alcohol.  10.) Tolerance, as defined by either of the following: A need for markedly increased amounts of alcohol/drug to achieve intoxication or desired effect.      Specify if: In early remission:  After full criteria for alcohol/drug use disorder were previously met, none of the criteria for alcohol/drug use disorder have been met for at least 3 months but for less than 12 months (with the exception that Criterion A4,   Craving or a strong desire or urge to use alcohol/drug  may be met).     In sustained remission:   After full criteria for alcohol use disorder were previously met, non of the criteria for alcohol/drug use disorder have been met at any time during a period of 12 months or longer (with the exception that Criterion A4,  Craving or strong desire or urge to use alcohol/drug  may be met).   Specify if:   This additional specifier is used if the individual is in an environment where access to alcohol is restricted.    Mild: Presence of 2-3 symptoms  Moderate: Presence of 4-5 symptoms  Severe: Presence of 6 or more symptoms
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
## 2020-10-27 ASSESSMENT — ANXIETY QUESTIONNAIRES: GAD7 TOTAL SCORE: 16

## 2020-10-27 NOTE — ADDENDUM NOTE
Encounter addended by: Manpreet Trotter LADC on: 10/27/2020 8:01 AM   Actions taken: Clinical Note Signed

## 2020-10-27 NOTE — ADDENDUM NOTE
Encounter addended by: Manpreet Trotter LADC on: 10/27/2020 10:27 AM   Actions taken: Clinical Note Signed

## 2021-05-06 ENCOUNTER — HOSPITAL ENCOUNTER (EMERGENCY)
Dept: EMERGENCY MEDICINE | Facility: HOSPITAL | Age: 26
Discharge: HOME | End: 2021-05-06
Attending: EMERGENCY MEDICINE
Payer: COMMERCIAL

## 2021-05-06 DIAGNOSIS — S51.812A FOREARM LACERATION, LEFT, INITIAL ENCOUNTER: ICD-10-CM

## 2021-05-06 ASSESSMENT — MIFFLIN-ST. JEOR: SCORE: 1637.17

## 2021-05-27 VITALS — WEIGHT: 140 LBS | BODY MASS INDEX: 19.6 KG/M2 | HEIGHT: 71 IN

## 2021-06-17 NOTE — ED TRIAGE NOTES
Pt was at work folding aluminum, cut L forearm. Unknown last tetanus. Pt has dressing wrapped around arm, bleeding controlled. Dressing removed, approx 2 inch laceration noted. Clean pressure dressing applied. Pt tolerated well.

## 2021-06-17 NOTE — ED PROVIDER NOTES
EMERGENCY DEPARTMENT ENCOUNTER      NAME: Nomi Miller  AGE: 25 y.o. male  YOB: 1995  MRN: [id number]  EVALUATION DATE & TIME: 2021  6:22 PM    PCP: [facility]    ED PROVIDER: Juan Moore M.D., FACEP      Chief Complaint   Patient presents with     Laceration     L arm         FINAL IMPRESSION:  1.  Acute 3 cm left forearm laceration.      ED COURSE & MEDICAL DECISION MAKIN:27 PM I performed my initial history, physical exam, and discussed ED plan and course. I saw the patient wearing cloth mask.  Patient with 3 similar laceration.  Full range of motion of flexion and extension.  Sensation intact soft touch.  Patient updated with tetanus vaccination.  7:15 PM.  Laceration closure finished as explained below.  Patient is discharged home.  Patient in agreement with the plan.    Pertinent Labs & Imaging studies reviewed. (See chart for details)       At the conclusion of the encounter I discussed the results of all of the tests and the disposition. The questions were answered. The patient or family acknowledged understanding of the findings and was agreeable with the care plan.         MEDICATIONS GIVEN IN THE EMERGENCY:  Medications - No data to display    NEW PRESCRIPTIONS STARTED AT TODAY'S ER VISIT  Current Discharge Medication List      CONTINUE these medications which have NOT CHANGED    Details   oxyCODONE (ROXICODONE) 5 MG immediate release tablet Take 1 tablet (5 mg total) by mouth every 6 (six) hours as needed for pain.  Qty: 12 tablet, Refills: 0    Associated Diagnoses: Pain, dental                =================================================================    HPI    Patient information was obtained from: Patient    Use of : N/A       Nomi Miller is a 25 y.o. male with a pertinent history of polysubstance abuse, depression who presents to the ED via walk in for evaluation of laceration.    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 He dressed the wound while at work but did not clean it. He endorses pain at the wound site with ranging his arm but denies numbness. He is left hand dominant. Last tetanus was in 2013.    Patient does not identify any waxing or waning symptoms otherwise, exacerbating or alleviating features, associated symptoms except as mentioned. No other complaints expressed at this time.      REVIEW OF SYSTEMS   Constitutional:  Denies fever, chills, weight loss or weakness  Eyes:  No pain, discharge, redness   HENT:  Denies sore throat, ear pain, congestion   Respiratory: No SOB, wheeze or cough  Cardiovascular:  No CP, palpitations  GI:  Denies abdominal pain, nausea, vomiting, diarrhea  : Denies dysuria, denies hematuria  Musculoskeletal:  Positive for left forearm pain (at wound site)  Skin:  Denies rash, pallor. Positive for left forearm laceration  Neurologic:  Denies headache, focal weakness or sensory changes  Lymph: Denies swollen nodes    All other systems reviewed and are negative.    PAST MEDICAL HISTORY:  No past medical history on file.    PAST SURGICAL HISTORY:  Relevant past surgical history reviewed with patient, unless otherwise stated in HPI, history not pertinent to this visit.    CURRENT MEDICATIONS:    No current facility-administered medications on file prior to encounter.      Current Outpatient Medications on File Prior to Encounter   Medication Sig     oxyCODONE (ROXICODONE) 5 MG immediate release tablet Take 1 tablet (5 mg total) by mouth every 6 (six) hours as needed for pain.       ALLERGIES:  No Known Allergies    FAMILY HISTORY:  No family history on file.    SOCIAL HISTORY:   Social History     Socioeconomic History     Marital status: Single     Spouse name: Not on file     Number of children: Not on file     Years of education: Not on file     Highest education level: Not on file   Occupational History     Not on file   Social Needs      "Financial resource strain: Not on file     Food insecurity     Worry: Not on file     Inability: Not on file     Transportation needs     Medical: Not on file     Non-medical: Not on file   Tobacco Use     Smoking status: Not on file   Substance and Sexual Activity     Alcohol use: Not on file     Drug use: Not on file     Sexual activity: Not on file   Lifestyle     Physical activity     Days per week: Not on file     Minutes per session: Not on file     Stress: Not on file   Relationships     Social connections     Talks on phone: Not on file     Gets together: Not on file     Attends Mormonism service: Not on file     Active member of club or organization: Not on file     Attends meetings of clubs or organizations: Not on file     Relationship status: Not on file     Intimate partner violence     Fear of current or ex partner: Not on file     Emotionally abused: Not on file     Physically abused: Not on file     Forced sexual activity: Not on file   Other Topics Concern     Not on file   Social History Narrative     Not on file       Patient denies any  tobacco, or alcohol use.  Medical records indicate a history of opiate and methamphetamine abuse.    VITALS:  Patient Vitals for the past 24 hrs:   BP Temp Temp src Pulse Resp SpO2 Height Weight   05/06/21 1743 137/72 98.1  F (36.7  C) Temporal (!) 101 18 98 % 5' 11\" (1.803 m) 140 lb (63.5 kg)       PHYSICAL EXAM    VITAL SIGNS: /72 (Patient Position: Sitting)   Pulse (!) 101   Temp 98.1  F (36.7  C) (Temporal)   Resp 18   Ht 5' 11\" (1.803 m)   Wt 140 lb (63.5 kg)   SpO2 98%   BMI 19.53 kg/m     General:  On entering the room he is in no apparent distress.    Neck:  Neck supple with full range of motion and nontender.    Back:  Back and spine are nontender.  No costovertebral angle tenderness.    HEENT:  Oropharynx clear with moist mucous membranes.  HEENT unremarkable.    Pulmonary:  Chest clear to auscultation without rhonchi rales or wheezing.  "   Cardiovascular:  Cardiac regular rate and rhythm without murmurs rubs or gallops.    Abdomen:  Abdomen soft nontender.  There is no rebound or guarding.    Muskuloskeletal:  he moves all 4 without any difficulty and has normal neurovascular exams.  Extremities without clubbing, cyanosis, or edema.  Legs and calves are nontender.  Full range of motion of left arm, hand, fingers.  Good pulse cap refill.  Intact flexion and extension.  Volar left forearm midshaft 3 cm laceration.  Neuro:  he is alert and oriented ×3 and moves all extremities symmetrically.    Psych:  Normal affect.    Skin:  Unremarkable and warm and dry.     LAB:  All pertinent labs reviewed and interpreted.  No results found for this visit on 05/06/21.    RADIOLOGY:  Reviewed all pertinent imaging. Please see official radiology report.  No results found.      PROCEDURES:   Cut numbed with 1% plain lidocaine.  The wound was thoroughly irrigated and explored and closed with a combination of horizontal mattress and interrupted 5-0 Ethilon sutures.  A total of 7 sutures were placed.  Patient tolerated procedure well.  Good approximation was obtained.      I, Karlie Bruce, am serving as a scribe to document services personally performed by Dr. Moore based on my observation and the provider's statements to me. I, Juan Moore MD attest that Karlie Bruce is acting in a scribe capacity, has observed my performance of the services and has documented them in accordance with my direction.    Juan Moore M.D., [facility]  Emergency Medicine  [facility] EMERGENCY DEPARTMENT  [address]  [facility] [address]  Dept: [phone]  Loc: [phone]         Juan Moore MD  05/06/21 0899

## 2021-11-08 ENCOUNTER — HOSPITAL ENCOUNTER (EMERGENCY)
Facility: CLINIC | Age: 26
Discharge: HOME | End: 2021-11-08
Attending: EMERGENCY MEDICINE | Admitting: EMERGENCY MEDICINE
Payer: COMMERCIAL

## 2021-11-08 VITALS
WEIGHT: 140 LBS | OXYGEN SATURATION: 97 % | BODY MASS INDEX: 19.6 KG/M2 | HEIGHT: 71 IN | SYSTOLIC BLOOD PRESSURE: 146 MMHG | DIASTOLIC BLOOD PRESSURE: 110 MMHG | HEART RATE: 121 BPM | TEMPERATURE: 97.8 F | RESPIRATION RATE: 16 BRPM

## 2021-11-08 DIAGNOSIS — R44.3 HALLUCINATIONS: ICD-10-CM

## 2021-11-08 LAB
AMPHETAMINES UR QL SCN: ABNORMAL
BARBITURATES UR QL: ABNORMAL
BENZODIAZ UR QL: ABNORMAL
CANNABINOIDS UR QL SCN: ABNORMAL
COCAINE UR QL: ABNORMAL
OPIATES UR QL SCN: ABNORMAL
SARS-COV-2 RNA RESP QL NAA+PROBE: NEGATIVE

## 2021-11-08 PROCEDURE — 99285 EMERGENCY DEPT VISIT HI MDM: CPT | Mod: 25

## 2021-11-08 PROCEDURE — 250N000013 HC RX MED GY IP 250 OP 250 PS 637: Performed by: EMERGENCY MEDICINE

## 2021-11-08 PROCEDURE — 87635 SARS-COV-2 COVID-19 AMP PRB: CPT | Performed by: EMERGENCY MEDICINE

## 2021-11-08 PROCEDURE — 80307 DRUG TEST PRSMV CHEM ANLYZR: CPT | Performed by: EMERGENCY MEDICINE

## 2021-11-08 PROCEDURE — 90791 PSYCH DIAGNOSTIC EVALUATION: CPT

## 2021-11-08 RX ORDER — QUETIAPINE FUMARATE 25 MG/1
50 TABLET, FILM COATED ORAL 2 TIMES DAILY
Qty: 40 TABLET | Refills: 0 | Status: SHIPPED | OUTPATIENT
Start: 2021-11-08 | End: 2022-11-17

## 2021-11-08 RX ORDER — LORAZEPAM 1 MG/1
1 TABLET ORAL ONCE
Status: COMPLETED | OUTPATIENT
Start: 2021-11-08 | End: 2021-11-08

## 2021-11-08 RX ORDER — LORAZEPAM 1 MG/1
1 TABLET ORAL EVERY 8 HOURS PRN
Status: DISCONTINUED | OUTPATIENT
Start: 2021-11-08 | End: 2021-11-08 | Stop reason: HOSPADM

## 2021-11-08 RX ADMIN — LORAZEPAM 1 MG: 1 TABLET ORAL at 03:22

## 2021-11-08 ASSESSMENT — ENCOUNTER SYMPTOMS
COUGH: 0
NAUSEA: 0
HEADACHES: 0
VOMITING: 0
HALLUCINATIONS: 1

## 2021-11-08 ASSESSMENT — MIFFLIN-ST. JEOR: SCORE: 1637.17

## 2021-11-08 NOTE — DISCHARGE INSTRUCTIONS
The following psychiatry appointment has been scheduled for you.  The provider will contact you by email before the appointment with instructions.     Scheduled Appointment  Date: Thursday, 11/18/2021  Time: 11:00 am - 12:00 pm  Provider: Alla Bernabe  MSN  CNP,PMHNP,RN  Location: [facility], [address] [place] [address] [place] [address]  Phone: [phone]  Type: Telepsychiatry    Patient Instructions  All Intake appointments will be conducted via telehealth and must have access to video through smart phone or laptop/pc/tablet. You will be contacted by our office to set up the virtual meeting. If you have questions, please contact our office at [phone].  Other Information  Patient's email address is [email or web address]      [facility] Care Coordinator  Call the Customer Service number on the back of your insurance card and ask to be assigned a care coordinator.  There is no extra cost for this service.  It is part of your current benefits.  A care coordinator is a licensed clinician who is available by phone for up to four weeks.  They are able to give you information about your insurance plan and co-pays, find providers who accept your insurance, and more.  Call [phone].
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
## 2021-11-08 NOTE — ED TRIAGE NOTES
Patient states he sees spiders crawling all over his body x 2 hours. Patient has history of schizophrenia and has not taken his meds in over 1 year. Patient denies SI/HI. No pain.
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
## 2022-02-02 ENCOUNTER — HOSPITAL ENCOUNTER (EMERGENCY)
Facility: CLINIC | Age: 27
Discharge: HOME | End: 2022-02-02
Attending: EMERGENCY MEDICINE | Admitting: EMERGENCY MEDICINE
Payer: COMMERCIAL

## 2022-02-02 VITALS
DIASTOLIC BLOOD PRESSURE: 88 MMHG | RESPIRATION RATE: 18 BRPM | TEMPERATURE: 98.7 F | OXYGEN SATURATION: 97 % | SYSTOLIC BLOOD PRESSURE: 104 MMHG | HEART RATE: 94 BPM

## 2022-02-02 DIAGNOSIS — K08.89 PAIN, DENTAL: ICD-10-CM

## 2022-02-02 PROCEDURE — 250N000013 HC RX MED GY IP 250 OP 250 PS 637: Performed by: EMERGENCY MEDICINE

## 2022-02-02 PROCEDURE — 99283 EMERGENCY DEPT VISIT LOW MDM: CPT | Mod: 25

## 2022-02-02 PROCEDURE — 64400 NJX AA&/STRD TRIGEMINAL NRV: CPT

## 2022-02-02 RX ORDER — BUPIVACAINE HYDROCHLORIDE 5 MG/ML
INJECTION, SOLUTION PERINEURAL
Status: DISCONTINUED
Start: 2022-02-02 | End: 2022-02-02 | Stop reason: HOSPADM

## 2022-02-02 RX ORDER — IBUPROFEN 600 MG/1
600 TABLET, FILM COATED ORAL ONCE
Status: COMPLETED | OUTPATIENT
Start: 2022-02-02 | End: 2022-02-02

## 2022-02-02 RX ADMIN — IBUPROFEN 600 MG: 600 TABLET, FILM COATED ORAL at 03:35

## 2022-02-02 ASSESSMENT — ENCOUNTER SYMPTOMS
TROUBLE SWALLOWING: 0
FEVER: 1

## 2022-02-02 NOTE — ED TRIAGE NOTES
Pt to ER with c/o right upper tooth pain, pt was seen at [facility]  For  same , pt states antibiotics not helping

## 2022-02-02 NOTE — ED PROVIDER NOTES
History   Chief Complaint:  Dental Pain       The history is provided by the patient.      Nomi Miller is a 26 year old male who presents with dental pain. He provides that he has been having a right upper tooth pain for the last 3-4 days and was seen at [facility] for this where he was started on antibiotics. He comes to the ED for worsening pain saying the antibiotics have not helped. He additionally has had a fever but denies any difficulty swallowing.    Review of Systems   Constitutional: Positive for fever.   HENT: Positive for dental problem. Negative for trouble swallowing.    All other systems reviewed and are negative.      Allergies:  The patient has no known allergies.     Medications:  Wellbutrin  Minipress  Seroquel    Past Medical History:     ADHD  Anxiety  Asthma  Borderline intellectual functioning  Depression  Panic attacks  Reactive attachment disorder  Suicidal ideation  Closed displaced fracture of neck of fifth metacarpal bone of left hand    Intentional heroin overdose  Polysubstance abuse  Homelessness  Amblyopia  Hypertension    Past Surgical History:    The patient denies past surgical history.      Family History:    Depression  Substance abuse  Psychotic disorder  Mental illness  Domestic violence    Social History:  Patient came from home.  Patient is unaccompanied in the ED.    Physical Exam     Patient Vitals for the past 24 hrs:   BP Temp Temp src Pulse Resp SpO2   02/02/22 0225 104/88 98.7  F (37.1  C) Temporal (!) 122 20 98 %       Physical Exam  Constitutional:  Oriented to person, place, and time. Minor distress due to pain.  HENT:   Head:    Normocephalic.   Mouth/Throat:   Oropharynx is clear and moist. Tooth decay and poor oral hygiene. Decaying and fractured tooth of right upper premolar. No mucosa swelling. No purulence. No erythema.  Eyes:    EOM are normal. Pupils are equal, round, and reactive to light.   Neck:    Neck supple.   Musculoskeletal:  Normal  range of motion.   Neurological:   Alert and oriented to person, place, and time.           Moves all 4 extremities spontaneously    Skin:    No rash noted. No pallor.     Emergency Department Course     Procedures       Dental Block     INDICATIONS:  Dental Pain   LOCATION:  Right upper premolar  ANESTHESIA: Regional block using Bupivicaine, total of 3 mLs   PROCEDURE NOTE: The patient tolerated the procedure well with good relief of discomfort and there were no complications.    Emergency Department Course:       Reviewed:  I reviewed nursing notes, vitals, past medical history and Care Everywhere    Assessments:  0302 I obtained history and examined the patient as noted above.   0310 I rechecked the patient and performed dental block.     Disposition:  The patient was discharged to home.     Impression & Plan         Medical Decision Making:  Nomi Miller presents for a toothache. Evaluation today showed tooth of right upper premolar acutely tender to percussion. There is no evidence of surrounding abscess. There is no evidence of deep space infection of the neck or any sepsis-like syndrome. Given the concern for apical abscess and dental infection, the patient was given prescription for antibiotics from previous visit. Pt was given a prescription for motrin for pain control. The patient was strongly advised to seek dental care as soon as possible. I discussed with the patient that these medications do not represent definitive care for the tooth infection and definitive care by a dentist is needed. The patient is aware that I am not a dentist and that we do not have dentistry on call. The patient is aware that we cannot refill pain medications in the Emergency department and was also given a dental resource sheet. The patient will return to the emergency department for fever, uncontrolled pain, inability to tolerate PO, or onset of facial swelling. The patient expressed understanding.     Diagnosis:    ICD-10-CM     1. Pain, dental  K08.89        Discharge Medications:  New Prescriptions    No medications on file       Scribe Disclosure:  I, Eddi Arias, am serving as a scribe at 3:00 AM on 2/2/2022 to document services personally performed by Dakota Lowry MD based on my observations and the provider's statements to me.        Dakota Lowry MD  02/02/22 0345

## 2022-03-26 ENCOUNTER — APPOINTMENT (OUTPATIENT)
Dept: GENERAL RADIOLOGY | Facility: CLINIC | Age: 27
End: 2022-03-26
Attending: EMERGENCY MEDICINE
Payer: COMMERCIAL

## 2022-03-26 ENCOUNTER — HOSPITAL ENCOUNTER (EMERGENCY)
Facility: CLINIC | Age: 27
Discharge: HOME | End: 2022-03-26
Attending: EMERGENCY MEDICINE | Admitting: EMERGENCY MEDICINE
Payer: COMMERCIAL

## 2022-03-26 ENCOUNTER — OFFICE VISIT (OUTPATIENT)
Dept: URGENT CARE | Facility: URGENT CARE | Age: 27
End: 2022-03-26
Payer: COMMERCIAL

## 2022-03-26 VITALS
OXYGEN SATURATION: 98 % | DIASTOLIC BLOOD PRESSURE: 78 MMHG | RESPIRATION RATE: 20 BRPM | HEART RATE: 78 BPM | SYSTOLIC BLOOD PRESSURE: 128 MMHG | TEMPERATURE: 98 F

## 2022-03-26 VITALS
HEART RATE: 82 BPM | DIASTOLIC BLOOD PRESSURE: 79 MMHG | TEMPERATURE: 97 F | SYSTOLIC BLOOD PRESSURE: 127 MMHG | RESPIRATION RATE: 16 BRPM | OXYGEN SATURATION: 100 %

## 2022-03-26 DIAGNOSIS — S60.811A INFECTED ABRASION OF RIGHT WRIST, INITIAL ENCOUNTER: ICD-10-CM

## 2022-03-26 DIAGNOSIS — Z53.9 DIAGNOSIS NOT YET DEFINED: Primary | ICD-10-CM

## 2022-03-26 DIAGNOSIS — L08.9 INFECTED ABRASION OF RIGHT WRIST, INITIAL ENCOUNTER: ICD-10-CM

## 2022-03-26 DIAGNOSIS — S40.011A CONTUSION OF RIGHT SHOULDER, INITIAL ENCOUNTER: ICD-10-CM

## 2022-03-26 PROCEDURE — 73030 X-RAY EXAM OF SHOULDER: CPT | Mod: RT

## 2022-03-26 PROCEDURE — 73110 X-RAY EXAM OF WRIST: CPT | Mod: RT

## 2022-03-26 PROCEDURE — 99285 EMERGENCY DEPT VISIT HI MDM: CPT | Mod: 25

## 2022-03-26 PROCEDURE — 96372 THER/PROPH/DIAG INJ SC/IM: CPT | Performed by: EMERGENCY MEDICINE

## 2022-03-26 PROCEDURE — 73060 X-RAY EXAM OF HUMERUS: CPT | Mod: RT

## 2022-03-26 PROCEDURE — 250N000011 HC RX IP 250 OP 636: Performed by: EMERGENCY MEDICINE

## 2022-03-26 RX ORDER — KETOROLAC TROMETHAMINE 30 MG/ML
30 INJECTION, SOLUTION INTRAMUSCULAR; INTRAVENOUS ONCE
Status: COMPLETED | OUTPATIENT
Start: 2022-03-26 | End: 2022-03-26

## 2022-03-26 RX ADMIN — KETOROLAC TROMETHAMINE 30 MG: 30 INJECTION, SOLUTION INTRAMUSCULAR at 20:15

## 2022-03-26 ASSESSMENT — ENCOUNTER SYMPTOMS
SHORTNESS OF BREATH: 0
ARTHRALGIAS: 1
NUMBNESS: 0

## 2022-03-27 NOTE — ED TRIAGE NOTES
Pt presents with left shoulder pain after falling off his skateboard, denies hitting head. Pt concerned for dislocation. Pt alert, oriented x3 ABCS intact
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
## 2022-03-27 NOTE — DISCHARGE INSTRUCTIONS
Motrin 600mg ever 8 hours for pain  Ice and use sling for comfort  Follow up with your doctor in 1 week if not better  Daily cleaning of wrist wound with soap and water

## 2022-05-02 ENCOUNTER — OFFICE VISIT (OUTPATIENT)
Dept: URGENT CARE | Facility: URGENT CARE | Age: 27
End: 2022-05-02
Payer: COMMERCIAL

## 2022-05-02 VITALS
RESPIRATION RATE: 20 BRPM | HEART RATE: 63 BPM | BODY MASS INDEX: 24.27 KG/M2 | SYSTOLIC BLOOD PRESSURE: 117 MMHG | TEMPERATURE: 98.4 F | DIASTOLIC BLOOD PRESSURE: 65 MMHG | WEIGHT: 174 LBS | OXYGEN SATURATION: 100 %

## 2022-05-02 DIAGNOSIS — K04.7 DENTAL ABSCESS: Primary | ICD-10-CM

## 2022-05-02 PROCEDURE — 99213 OFFICE O/P EST LOW 20 MIN: CPT | Performed by: FAMILY MEDICINE

## 2022-05-02 RX ORDER — AMOXICILLIN 875 MG
875 TABLET ORAL 2 TIMES DAILY
Qty: 20 TABLET | Refills: 0 | Status: SHIPPED | OUTPATIENT
Start: 2022-05-02 | End: 2022-05-12

## 2022-05-02 RX ORDER — ACETAMINOPHEN 500 MG
500-1000 TABLET ORAL EVERY 6 HOURS PRN
Qty: 30 TABLET | Refills: 1 | Status: SHIPPED | OUTPATIENT
Start: 2022-05-02 | End: 2022-05-07

## 2022-05-02 NOTE — PROGRESS NOTES
SUBJECTIVE: Nomi Miller is a 26 year old male presenting with a chief complaint of dental pain.  Onset of symptoms was day(s) ago.  Course of illness is worsening.    Severity moderate    Past Medical History:   Diagnosis Date     ADHD      Anxiety      Asthma      Borderline intellectual functioning      Depression      Marijuana use      Panic attacks      Reactive attachment disorder      No Known Allergies  Social History     Tobacco Use     Smoking status: Never Smoker     Smokeless tobacco: Never Used   Substance Use Topics     Alcohol use: Yes     Comment: Once very couple months, 8 shots, no DWI, ED visits       ROS:  SKIN: no rash  GI: no vomiting    OBJECTIVE:  /65   Pulse 63   Temp 98.4  F (36.9  C)   Resp 20   Wt 78.9 kg (174 lb)   SpO2 100%   BMI 24.27 kg/m  GENERAL APPEARANCE: healthy, alert and no distress  EYES: EOMI,  PERRL, conjunctiva clear  HENT: vilateral lower teeth pain  NECK: supple, nontender, no lymphadenopathy  SKIN: no suspicious lesions or rashes      ICD-10-CM    1. Dental abscess  K04.7 amoxicillin (AMOXIL) 875 MG tablet     acetaminophen (TYLENOL) 500 MG tablet     F/u dental  Fluids/Rest, f/u if worse/not any better

## 2022-06-04 ENCOUNTER — HOSPITAL ENCOUNTER (EMERGENCY)
Facility: CLINIC | Age: 27
Discharge: HOME | End: 2022-06-04
Attending: EMERGENCY MEDICINE | Admitting: EMERGENCY MEDICINE
Payer: COMMERCIAL

## 2022-06-04 VITALS
RESPIRATION RATE: 20 BRPM | DIASTOLIC BLOOD PRESSURE: 63 MMHG | OXYGEN SATURATION: 99 % | SYSTOLIC BLOOD PRESSURE: 111 MMHG | TEMPERATURE: 98.3 F | HEART RATE: 86 BPM

## 2022-06-04 DIAGNOSIS — U07.1 INFECTION DUE TO 2019 NOVEL CORONAVIRUS: ICD-10-CM

## 2022-06-04 LAB
FLUAV RNA SPEC QL NAA+PROBE: NEGATIVE
FLUBV RNA RESP QL NAA+PROBE: NEGATIVE
RSV RNA SPEC NAA+PROBE: NEGATIVE
SARS-COV-2 RNA RESP QL NAA+PROBE: POSITIVE

## 2022-06-04 PROCEDURE — 87637 SARSCOV2&INF A&B&RSV AMP PRB: CPT | Performed by: EMERGENCY MEDICINE

## 2022-06-04 PROCEDURE — 258N000003 HC RX IP 258 OP 636: Performed by: EMERGENCY MEDICINE

## 2022-06-04 PROCEDURE — 36415 COLL VENOUS BLD VENIPUNCTURE: CPT | Performed by: EMERGENCY MEDICINE

## 2022-06-04 PROCEDURE — 96374 THER/PROPH/DIAG INJ IV PUSH: CPT

## 2022-06-04 PROCEDURE — 96361 HYDRATE IV INFUSION ADD-ON: CPT

## 2022-06-04 PROCEDURE — 250N000011 HC RX IP 250 OP 636: Performed by: EMERGENCY MEDICINE

## 2022-06-04 PROCEDURE — C9803 HOPD COVID-19 SPEC COLLECT: HCPCS

## 2022-06-04 PROCEDURE — 96375 TX/PRO/DX INJ NEW DRUG ADDON: CPT

## 2022-06-04 PROCEDURE — 99284 EMERGENCY DEPT VISIT MOD MDM: CPT | Mod: 25

## 2022-06-04 RX ORDER — DEXAMETHASONE SODIUM PHOSPHATE 10 MG/ML
10 INJECTION, SOLUTION INTRAMUSCULAR; INTRAVENOUS ONCE
Status: COMPLETED | OUTPATIENT
Start: 2022-06-04 | End: 2022-06-04

## 2022-06-04 RX ORDER — KETOROLAC TROMETHAMINE 15 MG/ML
15 INJECTION, SOLUTION INTRAMUSCULAR; INTRAVENOUS ONCE
Status: COMPLETED | OUTPATIENT
Start: 2022-06-04 | End: 2022-06-04

## 2022-06-04 RX ORDER — METOCLOPRAMIDE HYDROCHLORIDE 5 MG/ML
10 INJECTION INTRAMUSCULAR; INTRAVENOUS ONCE
Status: COMPLETED | OUTPATIENT
Start: 2022-06-04 | End: 2022-06-04

## 2022-06-04 RX ORDER — DIPHENHYDRAMINE HYDROCHLORIDE 50 MG/ML
25 INJECTION INTRAMUSCULAR; INTRAVENOUS ONCE
Status: COMPLETED | OUTPATIENT
Start: 2022-06-04 | End: 2022-06-04

## 2022-06-04 RX ORDER — SODIUM CHLORIDE 9 MG/ML
INJECTION, SOLUTION INTRAVENOUS CONTINUOUS
Status: DISCONTINUED | OUTPATIENT
Start: 2022-06-04 | End: 2022-06-04 | Stop reason: HOSPADM

## 2022-06-04 RX ADMIN — KETOROLAC TROMETHAMINE 15 MG: 15 INJECTION, SOLUTION INTRAMUSCULAR; INTRAVENOUS at 09:32

## 2022-06-04 RX ADMIN — SODIUM CHLORIDE 1000 ML: 9 INJECTION, SOLUTION INTRAVENOUS at 09:30

## 2022-06-04 RX ADMIN — DIPHENHYDRAMINE HYDROCHLORIDE 25 MG: 50 INJECTION, SOLUTION INTRAMUSCULAR; INTRAVENOUS at 09:32

## 2022-06-04 RX ADMIN — DEXAMETHASONE SODIUM PHOSPHATE 10 MG: 10 INJECTION, SOLUTION INTRAMUSCULAR; INTRAVENOUS at 09:32

## 2022-06-04 RX ADMIN — METOCLOPRAMIDE HYDROCHLORIDE 10 MG: 5 INJECTION INTRAMUSCULAR; INTRAVENOUS at 09:32

## 2022-06-04 ASSESSMENT — ENCOUNTER SYMPTOMS
VOMITING: 1
ABDOMINAL PAIN: 0
DYSURIA: 0
NAUSEA: 1
CHEST TIGHTNESS: 0
HEMATURIA: 0
COUGH: 0
SHORTNESS OF BREATH: 0
NECK PAIN: 1
HEADACHES: 1
PHOTOPHOBIA: 1
BACK PAIN: 1

## 2022-06-04 NOTE — ED TRIAGE NOTES
Pt states myalgias, headache, nausea, dry heaving and diarrhea for over 2 days. Pt states has not been vaccinated for flu or COVID. ABCs intact GCS 15     Triage Assessment     Row Name 06/04/22 0651       Triage Assessment (Adult)    Airway WDL WDL       Respiratory WDL    Respiratory WDL WDL       Skin Circulation/Temperature WDL    Skin Circulation/Temperature WDL WDL       Cardiac WDL    Cardiac WDL WDL       Peripheral/Neurovascular WDL    Peripheral Neurovascular WDL WDL       Cognitive/Neuro/Behavioral WDL    Cognitive/Neuro/Behavioral WDL WDL
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
## 2022-06-04 NOTE — ED NOTES
Patient discharged home with discharge paperwork to home. Vital signs stable at discharge. Education provided regarding pain management, increasing fluid intake, follow up with PCP and when to return to ED. Pt verbalized understanding. IV removed. Catheter intact. All questions answered.

## 2022-06-04 NOTE — DISCHARGE INSTRUCTIONS

## 2022-07-03 ENCOUNTER — HOSPITAL ENCOUNTER (EMERGENCY)
Facility: HOSPITAL | Age: 27
Discharge: HOME | End: 2022-07-03
Attending: EMERGENCY MEDICINE | Admitting: EMERGENCY MEDICINE
Payer: COMMERCIAL

## 2022-07-03 VITALS
RESPIRATION RATE: 20 BRPM | OXYGEN SATURATION: 97 % | SYSTOLIC BLOOD PRESSURE: 134 MMHG | HEIGHT: 71 IN | BODY MASS INDEX: 20.3 KG/M2 | WEIGHT: 145 LBS | TEMPERATURE: 98.5 F | DIASTOLIC BLOOD PRESSURE: 81 MMHG | HEART RATE: 105 BPM

## 2022-07-03 DIAGNOSIS — R21 RASH: ICD-10-CM

## 2022-07-03 DIAGNOSIS — B35.3 TINEA PEDIS OF BOTH FEET: ICD-10-CM

## 2022-07-03 PROCEDURE — 99282 EMERGENCY DEPT VISIT SF MDM: CPT

## 2022-07-03 RX ORDER — CLOTRIMAZOLE 1 %
CREAM (GRAM) TOPICAL 2 TIMES DAILY
Qty: 45 G | Refills: 0 | Status: SHIPPED | OUTPATIENT
Start: 2022-07-03 | End: 2022-07-17

## 2022-07-03 ASSESSMENT — ENCOUNTER SYMPTOMS
VOMITING: 0
FEVER: 0
DIZZINESS: 0
DIARRHEA: 0
HEMATURIA: 0
SHORTNESS OF BREATH: 0
ABDOMINAL PAIN: 0
NAUSEA: 0
CONFUSION: 0
DYSURIA: 0
JOINT SWELLING: 0
CHILLS: 0
SORE THROAT: 0

## 2022-07-03 NOTE — ED TRIAGE NOTES
Patient states he is at a sober living place, clean from meth for 10 mos.here today for rash at feet and arms for last two weeks.      Triage Assessment     Row Name 07/03/22 1203       Triage Assessment (Adult)    Airway WDL WDL       Respiratory WDL    Respiratory WDL WDL       Skin Circulation/Temperature WDL    Skin Circulation/Temperature WDL WDL       Cardiac WDL    Cardiac WDL WDL       Peripheral/Neurovascular WDL    Peripheral Neurovascular WDL WDL       Cognitive/Neuro/Behavioral WDL    Cognitive/Neuro/Behavioral WDL WDL

## 2022-07-03 NOTE — ED PROVIDER NOTES
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                ED COURSE & MEDICAL DECISION MAKING:        Pt presenting with at least a couple weeks of burning redness to b/l toes/feet with some itching.  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  Pt wears gloves that get hot/moist.  Exam could be some degree of tinea to feet, so put on clotrimazole. Discussed trying to dry hands/feet out, as well as clotrimazole for feet.  Encouraged trying a drying powder such as Gold Bond as well. Pt referred to primary, agreeable.    12:25 PM I met with the patient, obtained history, performed an initial exam, and discussed options and plan for diagnostics and treatment here in the ED.  12:32 PM Patient ready for discharge.     At the conclusion of the encounter I discussed the results of all the tests and the disposition. The questions were answered. The patient or family acknowledged understanding and was agreeable with the care plan.      MEDICATIONS GIVEN IN THE EMERGENCY DEPARTMENT:  Medications - No data to display    NEW PRESCRIPTIONS STARTED AT TODAY'S ED VISIT:  Discharge Medication List as of 7/3/2022 12:34 PM      START taking these medications    Details   clotrimazole (LOTRIMIN) 1 % external cream Apply topically 2 times daily for 14 daysDisp-45 g, R-0Local Print             HPI   HPI     Nomi Miller is a 27 year old male with a pertinent history of ADHD, anxiety, asthma and marijuana use who presents to this ED via walk in for evaluation of a rash.    Patient reports a rash on his hands and feet with redness and white splotches that began 2 weeks ago after he started a new job as a . Denies any other medical complaint at this time.      REVIEW OF SYSTEMS:  Review of Systems   Constitutional: Negative for chills and fever.   HENT: Negative for sore throat.    Eyes: Negative for visual disturbance.   Respiratory: Negative for shortness of breath.    Cardiovascular: Negative for chest pain.   Gastrointestinal: Negative for abdominal pain, diarrhea, nausea and vomiting.   Endocrine: Negative for polyuria.   Genitourinary: Negative for dysuria and hematuria.        - urinary changes     Musculoskeletal: Negative for joint swelling.   Skin: Positive for rash.   Neurological: Negative for dizziness.   Psychiatric/Behavioral: Negative for confusion.   All other systems reviewed and are negative.        Medical History     Past Medical History:   Diagnosis Date     ADHD      Anxiety      Asthma      Borderline intellectual functioning      Depression      Marijuana use      Panic attacks      Reactive attachment disorder        Past Surgical History:   Procedure Laterality Date     NO HISTORY OF SURGERY         Family History   Problem Relation Age of Onset     Depression Other      Substance Abuse Other         alcohol, meth, heroin, THC     Psychotic Disorder Other         ADHD     Substance Abuse Mother      Mental Illness Mother      Substance Abuse Father      Mental Illness Father      Substance Abuse Brother      Substance Abuse Sister      Mental Illness Sister      Substance Abuse Brother      Mental Illness Brother      Substance Abuse Brother      Mental Illness Brother        Social History     Tobacco Use     Smoking status: Never Smoker     Smokeless tobacco: Never Used   Substance Use Topics     Alcohol use: Yes     Comment: Once very couple months, 8 shots, no DWI, ED visits     Drug use: Yes     Frequency: 2.0 times per week     Types: Marijuana     Comment: vicodin - last took on 1/4/13 (2 pills)       clotrimazole (LOTRIMIN) 1 % external cream  acetaminophen (TYLENOL) 325 MG tablet  buPROPion (WELLBUTRIN XL) 150 MG 24 hr  "tablet  buPROPion (WELLBUTRIN XL) 300 MG 24 hr tablet  IBUPROFEN PO  prazosin (MINIPRESS) 2 MG capsule  QUEtiapine (SEROQUEL) 25 MG tablet        Physical Exam     Vitals:  /81   Pulse 105   Temp 98.5  F (36.9  C)   Resp 20   Ht 1.803 m (5' 11\")   Wt 65.8 kg (145 lb)   SpO2 97%   BMI 20.22 kg/m      PHYSICAL EXAM:   Physical Exam  Vitals and nursing note reviewed.   Constitutional:       General: He is not in acute distress.     Appearance: Normal appearance.   HENT:      Head: Normocephalic and atraumatic.      Nose: Nose normal.      Mouth/Throat:      Mouth: Mucous membranes are moist.   Eyes:      Extraocular Movements: Extraocular movements intact.   Cardiovascular:      Rate and Rhythm: Normal rate and regular rhythm.      Pulses: Normal pulses.           Radial pulses are 2+ on the right side and 2+ on the left side.        Dorsalis pedis pulses are 2+ on the right side and 2+ on the left side.   Pulmonary:      Effort: Pulmonary effort is normal.   Skin:     Findings: Rash present.      Comments: Erythematous, scaly skin involving toes of b/l feet including intertriginous spaces.  No vesicles, no open wounds   Neurological:      General: No focal deficit present.      Mental Status: He is alert. Mental status is at baseline.      Comments: Fluent speech   Psychiatric:         Mood and Affect: Mood normal.         Behavior: Behavior normal.           Results     LAB:  All pertinent labs reviewed and interpreted  Labs Ordered and Resulted from Time of ED Arrival to Time of ED Departure - No data to display    RADIOLOGY:  No orders to display                ECG:  none    PROCEDURES:  Procedures:  none      FINAL IMPRESSION:    ICD-10-CM    1. Rash  R21    2. Tinea pedis of both feet  B35.3     suspect       0 minutes of critical care time      Pierce AWAD, am serving as a scribe to document services personally performed by Dr. Juan Carvajal, based on my observations and the provider's " statements to me. I, Juan Carvajal, DO attest that Pierce Gamez is acting in a scribe capacity, has observed my performance of the services and has documented them in accordance with my direction.      Juan Carvajal DO  Emergency Medicine  [facility] EMERGENCY DEPARTMENT  7/3/2022  12:22 PM          Juan Carvajal MD  07/03/22 [id number]

## 2022-07-03 NOTE — DISCHARGE INSTRUCTIONS
Apply cream to both feet twice a day for next couple weeks at least. Try and reduce moisture to hands/feet as much as possible.  You can also try gold bond, which can dry things out. Follow up with a primary clinic.

## 2022-11-17 ENCOUNTER — HOSPITAL ENCOUNTER (EMERGENCY)
Facility: CLINIC | Age: 27
Discharge: HOME | End: 2022-11-17
Attending: PHYSICIAN ASSISTANT | Admitting: PHYSICIAN ASSISTANT
Payer: COMMERCIAL

## 2022-11-17 VITALS
DIASTOLIC BLOOD PRESSURE: 88 MMHG | TEMPERATURE: 98 F | SYSTOLIC BLOOD PRESSURE: 129 MMHG | HEART RATE: 94 BPM | RESPIRATION RATE: 18 BRPM | OXYGEN SATURATION: 99 %

## 2022-11-17 DIAGNOSIS — G47.9 SLEEP DISTURBANCE: ICD-10-CM

## 2022-11-17 DIAGNOSIS — K08.89 PAIN, DENTAL: ICD-10-CM

## 2022-11-17 PROCEDURE — 250N000011 HC RX IP 250 OP 636: Performed by: PHYSICIAN ASSISTANT

## 2022-11-17 PROCEDURE — 99284 EMERGENCY DEPT VISIT MOD MDM: CPT

## 2022-11-17 PROCEDURE — 96372 THER/PROPH/DIAG INJ SC/IM: CPT | Performed by: PHYSICIAN ASSISTANT

## 2022-11-17 RX ORDER — QUETIAPINE FUMARATE 25 MG/1
50 TABLET, FILM COATED ORAL 2 TIMES DAILY
Qty: 40 TABLET | Refills: 0 | Status: SHIPPED | OUTPATIENT
Start: 2022-11-17

## 2022-11-17 RX ORDER — KETOROLAC TROMETHAMINE 15 MG/ML
15 INJECTION, SOLUTION INTRAMUSCULAR; INTRAVENOUS ONCE
Status: COMPLETED | OUTPATIENT
Start: 2022-11-17 | End: 2022-11-17

## 2022-11-17 RX ORDER — BUPROPION HYDROCHLORIDE 150 MG/1
150 TABLET ORAL EVERY MORNING
Qty: 30 TABLET | Refills: 0 | Status: SHIPPED | OUTPATIENT
Start: 2022-11-17

## 2022-11-17 RX ADMIN — KETOROLAC TROMETHAMINE 15 MG: 15 INJECTION, SOLUTION INTRAMUSCULAR; INTRAVENOUS at 18:26

## 2022-11-17 ASSESSMENT — ENCOUNTER SYMPTOMS: SLEEP DISTURBANCE: 1

## 2022-11-17 NOTE — ED NOTES
"Right upper dental pain x 1 week. Pt reports today his friend gave him peppermint to \"get the infection out\". Pt now c/o worse pain and some blurred vision. Also reports taking 15mg of hydrocodone PTA.     In triage, pt screens high risk for suicide. Is requesting to speak with DEC.   "

## 2022-11-17 NOTE — ED PROVIDER NOTES
History     Chief Complaint:  Dental Pain       HPI   Nomi Miller is a 27 year old male with PMH of intentional overdose, depression, who presents with dental pain. Patient states he has dental pain in his upper left premolars. He has a scheduled dentist appointment in 1 month but states the pain is worsening. He tried taking applying peppermint oil this afternoon and he took liquid codeine. Patient states his pain has improved. Patient is also endorsing sleep disturbance including suicidal dreams. He is requesting to speak with social work today. Patient denies suicidal thoughts or plan today. Denies homicidal thoughts or plans. Denies alcohol use. Admits codeine use for pain today. Denies auditory or visual hallucinations. Patient is calm during interview.    ROS:  Review of Systems   HENT: Positive for dental problem.    Psychiatric/Behavioral: Positive for sleep disturbance. Negative for self-injury and suicidal ideas.   All other systems reviewed and are negative.    Allergies:  No Known Allergies     Medications:    buPROPion (WELLBUTRIN XL) 150 MG 24 hr tablet  QUEtiapine (SEROQUEL) 25 MG tablet  acetaminophen (TYLENOL) 325 MG tablet  IBUPROFEN PO  prazosin (MINIPRESS) 2 MG capsule        Past Medical History:    Past Medical History:   Diagnosis Date     ADHD      Anxiety      Asthma      Borderline intellectual functioning      Depression      Marijuana use      Panic attacks      Reactive attachment disorder        Past Surgical History:    Past Surgical History:   Procedure Laterality Date     NO HISTORY OF SURGERY          Family History:    family history includes Depression in an other family member; Mental Illness in his brother, brother, father, mother, and sister; Psychotic Disorder in an other family member; Substance Abuse in his brother, brother, brother, father, mother, sister, and another family member.    Social History:   reports that he has never smoked. He has never used smokeless  tobacco. He reports current alcohol use. He reports current drug use. Frequency: 2.00 times per week. Drug: Marijuana.  PCP: [facility]     Physical Exam     Patient Vitals for the past 24 hrs:   BP Temp Temp src Pulse Resp SpO2   11/17/22 1520 129/88 98  F (36.7  C) Temporal 94 18 99 %        Physical Exam  Constitutional: Alert, attentive, GCS 15  HENT:    Nose: Nose normal.    Mouth/Throat: Oropharynx is clear, mucous membranes are moist. Poor dentition. Breakdown of enamel with decay of left upper and lower premolars. No abscess or drainage from gingiva.  Eyes: EOM are normal.   CV: regular rate and rhythm; no murmurs, rubs or gallups  Chest: Effort normal and breath sounds normal.   GI:  There is no tenderness. No distension. Normal bowel sounds  MSK: Normal range of motion.   Neurological: Alert, attentive  Psych: Denies suicidal intent or plan. Denies homicidal intent or plan. No auditory or visual hallucinations. Patient is calm and oriented. Speech normal. Thought content normal.  Skin: Skin is warm and dry.      Emergency Department Course   ECG:  ECG results from 09/13/18   EKG 12 lead     Value    Interpretation ECG Click View Image link to view waveform and result     Emergency Department Course:    Mental Health Risk Assessment      PSS-3    Date and Time Over the past 2 weeks have you felt down, depressed, or hopeless? Over the past 2 weeks have you had thoughts of killing yourself? Have you ever attempted to kill yourself? When did this last happen? User   11/17/22 1519 yes yes yes between 1 and 6 months ago [facility]      C-SSRS ([place])    Date and Time Q1 Wished to be Dead (Past Month) Q2 Suicidal Thoughts (Past Month) Q3 Suicidal Thought Method Q4 Suicidal Intent without Specific Plan Q5 Suicide Intent with Specific Plan Q6 Suicide Behavior (Lifetime) Within the Past 3 Months? RETIRED: Level of Risk per Screen Screening Not Complete User   11/17/22 1519 yes yes no yes no yes -- --  -- [facility]                Item Assessment   Suicidal Ideation None   Plan None   Intent None   Suicidal or self-harm behaviors None   Risk Factors Sleep disturbance   Protective Factors Patient has place to sleep tonight     Reviewed:  I reviewed nursing notes, vitals, past medical history and Care Everywhere    Assessments:  1750 I obtained history and examined the patient as noted above.   1810 I rechecked the patient.      Interventions:  Medications   ketorolac (TORADOL) injection 15 mg (15 mg Intramuscular Given 11/17/22 1826)        Disposition:  The patient was discharged to home.     Impression & Plan    CMS Diagnoses: None    Medical Decision Making:  Patient is a pleasant 26 yo M who presents with dental pain and sleep disturbance. He is in no acute distress. Vital signs appropriate. Physical examination reveals dental decay of both left upper and lower premolars. No abscess or dental infection suspected. Patient is also endorsing sleep disturbance including dreams in which he commits suicide. Patient states he will occasionally get these dreams and would like to speak with a . Denies suicidal intent or plan today. Denies homicidal intent or plan. Normal neurological examination. No alcohol today.  Patient was offered dental block which he refused. IM Toradol given. We had a long discussion about patient's dream content. He continues to deny suicidal intent or plan and homicidal intent or plan. He is calm and oriented. No neurological deficits or intoxicating factors present. Patient was told wait time to talk with social work would be two hours. He does not want to wait that long. He would like to follow-up with primary care but requests refills of his medications. Prescriptions sent. Patient is does not appear to be a danger to self or others at this time based on his history and statements today. No legal hold indicated.  Patient states he had pain relief with medications. Local dental  resources provided. Supportive cares and return precautions to ED discussed. Patient expressed understanding of plan and was ready for discharge.    Diagnosis:    ICD-10-CM    1. Pain, dental  K08.89       2. Sleep disturbance  G47.9            Discharge Medications:  Discharge Medication List as of 11/17/2022  6:48 PM           11/17/2022   Concha Hollis PA-C Steinbrueck, Emily, PA-C  11/18/22 0046

## 2022-11-17 NOTE — ED TRIAGE NOTES
"PT called EMS due to severe tooth pain. Pt has not seen a dentist and reports a wait of 1 month. Pt was smoking on EMS arrival standing Pt also c/o \"vision changes because it hurts so bad\" ABC in tact. A/OX4      "
